# Patient Record
Sex: FEMALE | Race: WHITE | Employment: UNEMPLOYED | URBAN - METROPOLITAN AREA
[De-identification: names, ages, dates, MRNs, and addresses within clinical notes are randomized per-mention and may not be internally consistent; named-entity substitution may affect disease eponyms.]

---

## 2017-12-24 ENCOUNTER — OFFICE VISIT (OUTPATIENT)
Dept: FAMILY MEDICINE CLINIC | Facility: CLINIC | Age: 62
End: 2017-12-24

## 2017-12-24 VITALS
HEART RATE: 78 BPM | DIASTOLIC BLOOD PRESSURE: 88 MMHG | HEIGHT: 65 IN | SYSTOLIC BLOOD PRESSURE: 134 MMHG | TEMPERATURE: 98 F | BODY MASS INDEX: 28.32 KG/M2 | WEIGHT: 170 LBS | RESPIRATION RATE: 14 BRPM

## 2017-12-24 DIAGNOSIS — J06.9 UPPER RESPIRATORY TRACT INFECTION, UNSPECIFIED TYPE: Primary | ICD-10-CM

## 2017-12-24 PROCEDURE — 99202 OFFICE O/P NEW SF 15 MIN: CPT | Performed by: NURSE PRACTITIONER

## 2017-12-24 NOTE — PROGRESS NOTES
CHIEF COMPLAINT:   Patient presents with:  URI      HPI:   Meeta Vazquez is a 63year old female who presents for upper respiratory symptoms for  4 days. Patient reports cough and nasal congestion. Location is upper chest and mid face.  Symptoms are descr EARS: Canals are clear with no discharge or inflammation, TM's are pink and intact, no bulging, no retraction, bony landmarks are visible. Fluid is present behind bothTM's.    NOSE: nostrils patent, clear nasal mucous present, nasal mucosa is erythemic, an Patient verbalized understanding of diagnosis and treatment. All questions answered. No impediment to understanding.   Patient instructed to have her blood pressure checked in next 2 weeks, negative consequences of high blood pressure discussed with patient · Over-the-counter cold medicines will not shorten the length of time you’re sick, but they may be helpful for the following symptoms: cough, sore throat, and nasal and sinus congestion.  (Note: Do not use decongestants if you have high blood pressure.)  Fo

## 2021-09-17 ENCOUNTER — OFFICE VISIT (OUTPATIENT)
Dept: OBGYN CLINIC | Facility: CLINIC | Age: 66
End: 2021-09-17
Payer: COMMERCIAL

## 2021-09-17 VITALS
DIASTOLIC BLOOD PRESSURE: 85 MMHG | SYSTOLIC BLOOD PRESSURE: 130 MMHG | BODY MASS INDEX: 31 KG/M2 | HEART RATE: 75 BPM | WEIGHT: 185 LBS

## 2021-09-17 DIAGNOSIS — Z13.29 THYROID DISORDER SCREEN: ICD-10-CM

## 2021-09-17 DIAGNOSIS — Z13.1 DIABETES MELLITUS SCREENING: ICD-10-CM

## 2021-09-17 DIAGNOSIS — N64.4 BREAST PAIN: ICD-10-CM

## 2021-09-17 DIAGNOSIS — Z13.220 SCREENING CHOLESTEROL LEVEL: ICD-10-CM

## 2021-09-17 DIAGNOSIS — N63.0 LUMP OR MASS IN BREAST: ICD-10-CM

## 2021-09-17 DIAGNOSIS — Z12.4 PAPANICOLAOU SMEAR FOR CERVICAL CANCER SCREENING: ICD-10-CM

## 2021-09-17 DIAGNOSIS — Z13.0 SCREENING FOR DEFICIENCY ANEMIA: ICD-10-CM

## 2021-09-17 DIAGNOSIS — Z01.419 WOMEN'S ANNUAL ROUTINE GYNECOLOGICAL EXAMINATION: Primary | ICD-10-CM

## 2021-09-17 PROCEDURE — 3079F DIAST BP 80-89 MM HG: CPT | Performed by: ADVANCED PRACTICE MIDWIFE

## 2021-09-17 PROCEDURE — 3075F SYST BP GE 130 - 139MM HG: CPT | Performed by: ADVANCED PRACTICE MIDWIFE

## 2021-09-17 PROCEDURE — 99386 PREV VISIT NEW AGE 40-64: CPT | Performed by: ADVANCED PRACTICE MIDWIFE

## 2021-09-20 LAB — HPV I/H RISK 1 DNA SPEC QL NAA+PROBE: NEGATIVE

## 2021-09-21 LAB — LAST PAP RESULT: NORMAL

## 2021-09-23 ENCOUNTER — HOSPITAL ENCOUNTER (OUTPATIENT)
Dept: MAMMOGRAPHY | Facility: HOSPITAL | Age: 66
Discharge: HOME | End: 2021-09-23
Attending: ADVANCED PRACTICE MIDWIFE
Payer: COMMERCIAL

## 2021-09-23 ENCOUNTER — LAB ENCOUNTER (OUTPATIENT)
Dept: LAB | Facility: HOSPITAL | Age: 66
End: 2021-09-23
Attending: ADVANCED PRACTICE MIDWIFE
Payer: COMMERCIAL

## 2021-09-23 ENCOUNTER — HOSPITAL ENCOUNTER (OUTPATIENT)
Dept: ULTRASOUND IMAGING | Facility: HOSPITAL | Age: 66
Discharge: HOME | End: 2021-09-23
Attending: ADVANCED PRACTICE MIDWIFE
Payer: COMMERCIAL

## 2021-09-23 DIAGNOSIS — N63.0 LUMP OR MASS IN BREAST: ICD-10-CM

## 2021-09-23 DIAGNOSIS — Z13.220 SCREENING CHOLESTEROL LEVEL: ICD-10-CM

## 2021-09-23 DIAGNOSIS — Z13.0 SCREENING FOR DEFICIENCY ANEMIA: ICD-10-CM

## 2021-09-23 DIAGNOSIS — Z13.1 DIABETES MELLITUS SCREENING: ICD-10-CM

## 2021-09-23 DIAGNOSIS — Z01.419 WOMEN'S ANNUAL ROUTINE GYNECOLOGICAL EXAMINATION: ICD-10-CM

## 2021-09-23 DIAGNOSIS — N64.4 BREAST PAIN: ICD-10-CM

## 2021-09-23 DIAGNOSIS — Z13.29 THYROID DISORDER SCREEN: ICD-10-CM

## 2021-09-23 LAB
ALBUMIN SERPL-MCNC: 3.8 G/DL (ref 3.4–5)
ALBUMIN/GLOB SERPL: 1.1 {RATIO} (ref 1–2)
ALP LIVER SERPL-CCNC: 115 U/L
ALT SERPL-CCNC: 25 U/L
ANION GAP SERPL CALC-SCNC: 5 MMOL/L (ref 0–18)
AST SERPL-CCNC: 17 U/L (ref 15–37)
BILIRUB SERPL-MCNC: 0.9 MG/DL (ref 0.1–2)
BUN BLD-MCNC: 19 MG/DL (ref 7–18)
BUN/CREAT SERPL: 28.8 (ref 10–20)
CALCIUM BLD-MCNC: 9.3 MG/DL (ref 8.5–10.1)
CHLORIDE SERPL-SCNC: 108 MMOL/L (ref 98–112)
CHOLEST SERPL-MCNC: 196 MG/DL (ref ?–200)
CO2 SERPL-SCNC: 27 MMOL/L (ref 21–32)
CREAT BLD-MCNC: 0.66 MG/DL
DEPRECATED RDW RBC AUTO: 40 FL (ref 35.1–46.3)
ERYTHROCYTE [DISTWIDTH] IN BLOOD BY AUTOMATED COUNT: 12.3 % (ref 11–15)
EST. AVERAGE GLUCOSE BLD GHB EST-MCNC: 169 MG/DL (ref 68–126)
GLOBULIN PLAS-MCNC: 3.6 G/DL (ref 2.8–4.4)
GLUCOSE BLD-MCNC: 153 MG/DL (ref 70–99)
HBA1C MFR BLD HPLC: 7.5 % (ref ?–5.7)
HCT VFR BLD AUTO: 45.9 %
HDLC SERPL-MCNC: 55 MG/DL (ref 40–59)
HGB BLD-MCNC: 15.3 G/DL
LDLC SERPL CALC-MCNC: 113 MG/DL (ref ?–100)
MCH RBC QN AUTO: 29.6 PG (ref 26–34)
MCHC RBC AUTO-ENTMCNC: 33.3 G/DL (ref 31–37)
MCV RBC AUTO: 88.8 FL
NONHDLC SERPL-MCNC: 141 MG/DL (ref ?–130)
OSMOLALITY SERPL CALC.SUM OF ELEC: 295 MOSM/KG (ref 275–295)
PATIENT FASTING Y/N/NP: YES
PATIENT FASTING Y/N/NP: YES
PLATELET # BLD AUTO: 294 10(3)UL (ref 150–450)
POTASSIUM SERPL-SCNC: 4.2 MMOL/L (ref 3.5–5.1)
PROT SERPL-MCNC: 7.4 G/DL (ref 6.4–8.2)
RBC # BLD AUTO: 5.17 X10(6)UL
SODIUM SERPL-SCNC: 140 MMOL/L (ref 136–145)
TRIGL SERPL-MCNC: 163 MG/DL (ref 30–149)
TSI SER-ACNC: 6.08 MIU/ML (ref 0.36–3.74)
VLDLC SERPL CALC-MCNC: 28 MG/DL (ref 0–30)
WBC # BLD AUTO: 7.7 X10(3) UL (ref 4–11)

## 2021-09-23 PROCEDURE — 84443 ASSAY THYROID STIM HORMONE: CPT

## 2021-09-23 PROCEDURE — 36415 COLL VENOUS BLD VENIPUNCTURE: CPT

## 2021-09-23 PROCEDURE — 83036 HEMOGLOBIN GLYCOSYLATED A1C: CPT

## 2021-09-23 PROCEDURE — 77062 BREAST TOMOSYNTHESIS BI: CPT | Performed by: ADVANCED PRACTICE MIDWIFE

## 2021-09-23 PROCEDURE — 3051F HG A1C>EQUAL 7.0%<8.0%: CPT | Performed by: FAMILY MEDICINE

## 2021-09-23 PROCEDURE — 76642 ULTRASOUND BREAST LIMITED: CPT | Performed by: ADVANCED PRACTICE MIDWIFE

## 2021-09-23 PROCEDURE — 80061 LIPID PANEL: CPT

## 2021-09-23 PROCEDURE — 77066 DX MAMMO INCL CAD BI: CPT | Performed by: ADVANCED PRACTICE MIDWIFE

## 2021-09-23 PROCEDURE — 80053 COMPREHEN METABOLIC PANEL: CPT

## 2021-09-23 PROCEDURE — 85027 COMPLETE CBC AUTOMATED: CPT

## 2021-09-24 ENCOUNTER — TELEPHONE (OUTPATIENT)
Dept: OBGYN CLINIC | Facility: CLINIC | Age: 66
End: 2021-09-24

## 2021-09-24 NOTE — TELEPHONE ENCOUNTER
PC to patient. Reviewed normal breast mammo & ultrasound findings. To continue with self breast exams and return if any changes. Discussed abnormal labs- HgB A1C in diabetic range with elevated fasting, abnormal TSH & elevated triglycerides.  Recommend f/u

## 2021-11-15 ENCOUNTER — OFFICE VISIT (OUTPATIENT)
Dept: INTERNAL MEDICINE CLINIC | Facility: CLINIC | Age: 66
End: 2021-11-15
Payer: COMMERCIAL

## 2021-11-15 VITALS
HEIGHT: 66 IN | OXYGEN SATURATION: 99 % | DIASTOLIC BLOOD PRESSURE: 70 MMHG | HEART RATE: 72 BPM | SYSTOLIC BLOOD PRESSURE: 124 MMHG | WEIGHT: 181 LBS | BODY MASS INDEX: 29.09 KG/M2

## 2021-11-15 DIAGNOSIS — H61.21 IMPACTED CERUMEN OF RIGHT EAR: ICD-10-CM

## 2021-11-15 DIAGNOSIS — Z23 NEED FOR INFLUENZA VACCINATION: ICD-10-CM

## 2021-11-15 DIAGNOSIS — R79.89 ELEVATED TSH: ICD-10-CM

## 2021-11-15 DIAGNOSIS — E11.69 TYPE 2 DIABETES MELLITUS WITH OTHER SPECIFIED COMPLICATION, WITHOUT LONG-TERM CURRENT USE OF INSULIN (HCC): Primary | ICD-10-CM

## 2021-11-15 PROCEDURE — 99213 OFFICE O/P EST LOW 20 MIN: CPT | Performed by: FAMILY MEDICINE

## 2021-11-15 PROCEDURE — 3074F SYST BP LT 130 MM HG: CPT | Performed by: FAMILY MEDICINE

## 2021-11-15 PROCEDURE — 3008F BODY MASS INDEX DOCD: CPT | Performed by: FAMILY MEDICINE

## 2021-11-15 PROCEDURE — G8483 FLU IMM NO ADMIN DOC REA: HCPCS | Performed by: FAMILY MEDICINE

## 2021-11-15 PROCEDURE — 3078F DIAST BP <80 MM HG: CPT | Performed by: FAMILY MEDICINE

## 2021-11-15 PROCEDURE — 84443 ASSAY THYROID STIM HORMONE: CPT | Performed by: FAMILY MEDICINE

## 2021-11-18 ENCOUNTER — OFFICE VISIT (OUTPATIENT)
Dept: OTOLARYNGOLOGY | Facility: CLINIC | Age: 66
End: 2021-11-18
Payer: COMMERCIAL

## 2021-11-18 VITALS — WEIGHT: 181 LBS | HEIGHT: 66 IN | BODY MASS INDEX: 29.09 KG/M2

## 2021-11-18 DIAGNOSIS — H61.23 BILATERAL IMPACTED CERUMEN: Primary | ICD-10-CM

## 2021-11-18 PROCEDURE — 99203 OFFICE O/P NEW LOW 30 MIN: CPT | Performed by: OTOLARYNGOLOGY

## 2021-11-18 PROCEDURE — 3008F BODY MASS INDEX DOCD: CPT | Performed by: OTOLARYNGOLOGY

## 2021-11-18 NOTE — PROGRESS NOTES
Bill Lynn is a 61year old female. Patient presents with:  Ear Problem: pt is here today for an ear cleaning.          HISTORY OF PRESENT ILLNESS  11/18/2021  Patient just moved back here from [place] she had seen Drs in the past for wax which they Dyspnea and wheezing. Cardio Negative Chest pain, irregular heartbeat   GI Negative Abdominal pain and diarrhea. Endocrine Negative Cold intolerance and heat intolerance. Neuro Negative Tremors.    Psych Negative Behavioral issues   Integumentary Devi Titus corrected.  While every attempt is made to correct errors during dictation, discrepancies may still exist     Germania Daniel MD 11/18/2021 10:08 AM

## 2021-11-20 NOTE — PROGRESS NOTES
PATIENT IDENTIFICATION  Name: Kat Kimble  MRN: [id number]    Diagnoses:   Type 2 diabetes mellitus with other specified complication, without long-term current use of insulin ([facility]  (primary encounter diagnosis)  Need for influenza vaccination  Elev ear  - ENT - INTERNAL    1. Extensively discussed risks of not treating. Patient endorsed understanding and would prefer to see what repeat a1c is and would like to work on diet in the mean time. Given referral to dietician. 3. Will also repeat TSH today.

## 2021-12-20 ENCOUNTER — OFFICE VISIT (OUTPATIENT)
Dept: INTERNAL MEDICINE CLINIC | Facility: CLINIC | Age: 66
End: 2021-12-20
Payer: COMMERCIAL

## 2021-12-20 VITALS
HEART RATE: 69 BPM | WEIGHT: 175 LBS | RESPIRATION RATE: 15 BRPM | SYSTOLIC BLOOD PRESSURE: 136 MMHG | DIASTOLIC BLOOD PRESSURE: 78 MMHG | HEIGHT: 66 IN | BODY MASS INDEX: 28.13 KG/M2

## 2021-12-20 DIAGNOSIS — E11.69 TYPE 2 DIABETES MELLITUS WITH OTHER SPECIFIED COMPLICATION, WITHOUT LONG-TERM CURRENT USE OF INSULIN (HCC): ICD-10-CM

## 2021-12-20 DIAGNOSIS — Z12.11 COLON CANCER SCREENING: ICD-10-CM

## 2021-12-20 DIAGNOSIS — E78.2 MIXED HYPERLIPIDEMIA: ICD-10-CM

## 2021-12-20 DIAGNOSIS — Z00.00 PREVENTATIVE HEALTH CARE: Primary | ICD-10-CM

## 2021-12-20 PROCEDURE — 82570 ASSAY OF URINE CREATININE: CPT | Performed by: FAMILY MEDICINE

## 2021-12-20 PROCEDURE — 82043 UR ALBUMIN QUANTITATIVE: CPT | Performed by: FAMILY MEDICINE

## 2021-12-20 PROCEDURE — 80061 LIPID PANEL: CPT | Performed by: FAMILY MEDICINE

## 2021-12-20 PROCEDURE — 3008F BODY MASS INDEX DOCD: CPT | Performed by: FAMILY MEDICINE

## 2021-12-20 PROCEDURE — 3075F SYST BP GE 130 - 139MM HG: CPT | Performed by: FAMILY MEDICINE

## 2021-12-20 PROCEDURE — 99396 PREV VISIT EST AGE 40-64: CPT | Performed by: FAMILY MEDICINE

## 2021-12-20 PROCEDURE — 3078F DIAST BP <80 MM HG: CPT | Performed by: FAMILY MEDICINE

## 2021-12-20 PROCEDURE — 83036 HEMOGLOBIN GLYCOSYLATED A1C: CPT | Performed by: FAMILY MEDICINE

## 2021-12-20 NOTE — PROGRESS NOTES
PATIENT IDENTIFICATION  Name: Kulwinder Goodrich  MRN: [id number]    Diagnoses:   Preventative health care  (primary encounter diagnosis)  Type 2 diabetes mellitus with other specified complication, without long-term current use of insulin ([facility]  Mixed hype nourished  HEENT: Normocephalic, atraumatic, normal TMs bilaterally, (posterior oropharynx not examined due to current pandemic and risk of COVID-19)   Neck: no anterior cervical lymphadenopathy  Lungs: chest is clear without rales or wheezing  Heart: S1,

## 2021-12-30 ENCOUNTER — NURSE ONLY (OUTPATIENT)
Dept: INTERNAL MEDICINE CLINIC | Facility: CLINIC | Age: 66
End: 2021-12-30
Payer: COMMERCIAL

## 2021-12-30 DIAGNOSIS — Z12.11 COLON CANCER SCREENING: ICD-10-CM

## 2021-12-30 PROCEDURE — 82274 ASSAY TEST FOR BLOOD FECAL: CPT | Performed by: FAMILY MEDICINE

## 2022-01-05 ENCOUNTER — VIRTUAL PHONE E/M (OUTPATIENT)
Dept: INTERNAL MEDICINE CLINIC | Facility: CLINIC | Age: 67
End: 2022-01-05
Payer: COMMERCIAL

## 2022-01-05 DIAGNOSIS — U07.1 COVID-19 VIRUS INFECTION: Primary | ICD-10-CM

## 2022-01-05 PROCEDURE — 99213 OFFICE O/P EST LOW 20 MIN: CPT | Performed by: FAMILY MEDICINE

## 2022-01-05 NOTE — PROGRESS NOTES
Telehealth outside of [address] Verbal Consent   I conducted a telehealth visit with Eliza Bella today, 01/05/22, which was completed using two-way, real-time interactive audio and video communication.  This has been done in good trace to pr also experiencing mild congestion that week  Then over the new years weekend noted couldn't smell anything. Then on 1/3 went to get tested and was positive. Feeling better today, wants to know if needs to get retested and when to return to work.      R

## 2022-05-11 ENCOUNTER — TELEPHONE (OUTPATIENT)
Dept: INTERNAL MEDICINE CLINIC | Facility: CLINIC | Age: 67
End: 2022-05-11

## 2022-05-12 ENCOUNTER — TELEPHONE (OUTPATIENT)
Dept: INTERNAL MEDICINE CLINIC | Facility: CLINIC | Age: 67
End: 2022-05-12

## 2022-05-16 ENCOUNTER — OFFICE VISIT (OUTPATIENT)
Dept: INTERNAL MEDICINE CLINIC | Facility: CLINIC | Age: 67
End: 2022-05-16
Payer: COMMERCIAL

## 2022-05-16 VITALS
HEIGHT: 66 IN | BODY MASS INDEX: 28.13 KG/M2 | DIASTOLIC BLOOD PRESSURE: 74 MMHG | HEART RATE: 82 BPM | RESPIRATION RATE: 15 BRPM | SYSTOLIC BLOOD PRESSURE: 132 MMHG | WEIGHT: 175 LBS

## 2022-05-16 DIAGNOSIS — L60.8 TOENAIL DEFORMITY: Primary | ICD-10-CM

## 2022-05-16 PROCEDURE — 3008F BODY MASS INDEX DOCD: CPT | Performed by: FAMILY MEDICINE

## 2022-05-16 PROCEDURE — 3078F DIAST BP <80 MM HG: CPT | Performed by: FAMILY MEDICINE

## 2022-05-16 PROCEDURE — 99213 OFFICE O/P EST LOW 20 MIN: CPT | Performed by: FAMILY MEDICINE

## 2022-05-16 PROCEDURE — 3075F SYST BP GE 130 - 139MM HG: CPT | Performed by: FAMILY MEDICINE

## 2022-06-02 ENCOUNTER — OFFICE VISIT (OUTPATIENT)
Dept: INTERNAL MEDICINE CLINIC | Facility: CLINIC | Age: 67
End: 2022-06-02
Payer: COMMERCIAL

## 2022-06-02 VITALS
DIASTOLIC BLOOD PRESSURE: 62 MMHG | SYSTOLIC BLOOD PRESSURE: 136 MMHG | WEIGHT: 177 LBS | HEIGHT: 66 IN | BODY MASS INDEX: 28.45 KG/M2

## 2022-06-02 DIAGNOSIS — E11.69 TYPE 2 DIABETES MELLITUS WITH OTHER SPECIFIED COMPLICATION, WITHOUT LONG-TERM CURRENT USE OF INSULIN (HCC): Primary | ICD-10-CM

## 2022-06-02 DIAGNOSIS — R79.89 ABNORMAL TSH: ICD-10-CM

## 2022-06-02 LAB
CARTRIDGE LOT#: ABNORMAL NUMERIC
CREAT UR-SCNC: 95.2 MG/DL
HEMOGLOBIN A1C: 6.9 % (ref 4.3–5.6)
MICROALBUMIN UR-MCNC: 4.56 MG/DL
MICROALBUMIN/CREAT 24H UR-RTO: 47.9 UG/MG (ref ?–30)
TSI SER-ACNC: 3.5 MIU/ML (ref 0.36–3.74)

## 2022-06-02 PROCEDURE — 3061F NEG MICROALBUMINURIA REV: CPT | Performed by: FAMILY MEDICINE

## 2022-06-02 PROCEDURE — 99214 OFFICE O/P EST MOD 30 MIN: CPT | Performed by: FAMILY MEDICINE

## 2022-06-02 PROCEDURE — 3078F DIAST BP <80 MM HG: CPT | Performed by: FAMILY MEDICINE

## 2022-06-02 PROCEDURE — 82043 UR ALBUMIN QUANTITATIVE: CPT | Performed by: FAMILY MEDICINE

## 2022-06-02 PROCEDURE — 84443 ASSAY THYROID STIM HORMONE: CPT | Performed by: FAMILY MEDICINE

## 2022-06-02 PROCEDURE — 3044F HG A1C LEVEL LT 7.0%: CPT | Performed by: FAMILY MEDICINE

## 2022-06-02 PROCEDURE — 3008F BODY MASS INDEX DOCD: CPT | Performed by: FAMILY MEDICINE

## 2022-06-02 PROCEDURE — 3075F SYST BP GE 130 - 139MM HG: CPT | Performed by: FAMILY MEDICINE

## 2022-06-02 PROCEDURE — 82570 ASSAY OF URINE CREATININE: CPT | Performed by: FAMILY MEDICINE

## 2022-06-02 PROCEDURE — 83036 HEMOGLOBIN GLYCOSYLATED A1C: CPT | Performed by: FAMILY MEDICINE

## 2022-06-02 PROCEDURE — 3060F POS MICROALBUMINURIA REV: CPT | Performed by: FAMILY MEDICINE

## 2022-07-22 ENCOUNTER — OFFICE VISIT (OUTPATIENT)
Dept: PODIATRY CLINIC | Facility: CLINIC | Age: 67
End: 2022-07-22
Payer: MEDICARE

## 2022-07-22 DIAGNOSIS — L60.8 DISCOLORATION AND THICKENING OF NAILS BOTH FEET: Primary | ICD-10-CM

## 2022-07-22 DIAGNOSIS — L60.3 ONYCHODYSTROPHY: ICD-10-CM

## 2022-07-22 PROCEDURE — 11755 BIOPSY NAIL UNIT: CPT | Performed by: PODIATRIST

## 2022-07-22 PROCEDURE — 99203 OFFICE O/P NEW LOW 30 MIN: CPT | Performed by: PODIATRIST

## 2022-07-22 NOTE — PROGRESS NOTES
[address] [facility]  Progress Note    Sourav Hernandez is a 74year old female. Patient presents with:  Toenail Fungus: Referred by Ephraim Roa - Bilateral hallux - started 6mths ago - discoloration - 0/10 pain in office          HPI:     This is a pleasant female that presents to clinic today due to bilateral great toenail fungus. She states she got it from the nail salon. She was using funginail solution for a few months which did not help. She states it started in January. She has also used peroxide. She denies any pain. Allergies: Codeine   No current outpatient medications on file. No past medical history on file. No past surgical history on file. No family history on file. Social History    Socioeconomic History      Marital status:     Tobacco Use      Smoking status: Never Smoker      Smokeless tobacco: Never Used    Substance and Sexual Activity      Alcohol use: Never      Drug use: Never          REVIEW OF SYSTEMS:   Denies nausea, fever, chills  No calf pain  No other muscle or joint aches  Denies chest pain or shortness of breath. EXAM:   There were no vitals taken for this visit. Constitutional:   Patient in no apparent distress. Well kept Of normal body habitus. Alert and oriented to person, place, and time. Integumentary examination:   There are no varicosities. Skin appears moist, warm, and supple with positive hair growth. B/L hallux toenails are thickened discolored dystrophic with subungual debris  Vascular examination:   DP pulse is 2/4  PT pulse is 2/4  Capillary refill is immediate  Edema is not present bilateral.  Temperature warm proximally to warm distally bilateral.  Neurological examination:   Vibratory (128-Hz tuning fork) sensation is present to right and is present to left. Sharp/dull is present to right and is present to left. Musculoskeletal examination:  Muscle Strength is 5/5.   Gait appears normal.      LABS & IMAGING:     Lab Results   Component Value Date     (H) 09/23/2021    BUN 19 (H) 09/23/2021    CREATSERUM 0.66 09/23/2021    BUNCREA 28.8 (H) 09/23/2021    ANIONGAP 5 09/23/2021    GFRAA 108 09/23/2021    GFRNAA 94 09/23/2021    CA 9.3 09/23/2021     09/23/2021    K 4.2 09/23/2021     09/23/2021    CO2 27.0 09/23/2021    OSMOCALC 295 09/23/2021        Lab Results   Component Value Date     (H) 12/20/2021    A1C 6.9 (A) 06/02/2022        No results found. ASSESSMENT AND PLAN:   Diagnoses and all orders for this visit:    Discoloration and thickening of nails both feet    Onychodystrophy        Plan:     Reviewed treatment options for nail dystrophy / onychomycosis including:   No treatment and monitoring, topical meds, oral meds, nail removal and laser treatment. Advantages and disadvantages of each reviewed. Using oral agents would require regular   blood test monitoring due to risk of hepatotoxicity and other adverse reactions including drug interactions. Topical meds are much safer yet carry very low efficacy. Pt elects for toenail biopsy  Procedure: Nail Biopsy 41090  Using nail forceps a portion of the b/l hallux toenail was excised and sent to pathology for PAS stain. Pt tolerated the procedure well without complication. Pt had CMP 9/23/21: Normal AST/ALT    If nail biopsy positive for fungus will prescribe oral lamisil and will call patient with results. Pt to spray feet and shoes with antifungal spray    RTC 2 months    No follow-ups on file.     Precious Morgan, SHAHNAZ  7/22/2022

## 2022-07-25 ENCOUNTER — TELEPHONE (OUTPATIENT)
Dept: PODIATRY CLINIC | Facility: CLINIC | Age: 67
End: 2022-07-25

## 2022-07-25 NOTE — TELEPHONE ENCOUNTER
Please inform patient that her nail biopsy was negative for fungus. We will hold off on the oral antifungal medication. If she has any further questions she can make an appt to f/u in a few weeks.

## 2022-07-25 NOTE — TELEPHONE ENCOUNTER
Left message for patient to give our office a call back.  If patient calls back she should schedule follow up with Dr. Abdelrahman Linder.

## 2022-08-25 ENCOUNTER — TELEPHONE (OUTPATIENT)
Dept: CASE MANAGEMENT | Age: 67
End: 2022-08-25

## 2022-08-25 DIAGNOSIS — Z12.11 COLON CANCER SCREENING: Primary | ICD-10-CM

## 2022-12-14 ENCOUNTER — TELEPHONE (OUTPATIENT)
Dept: INTERNAL MEDICINE CLINIC | Facility: CLINIC | Age: 67
End: 2022-12-14

## 2023-03-07 ENCOUNTER — OFFICE VISIT (OUTPATIENT)
Dept: FAMILY MEDICINE CLINIC | Facility: CLINIC | Age: 68
End: 2023-03-07

## 2023-03-07 VITALS
WEIGHT: 185 LBS | HEIGHT: 66 IN | BODY MASS INDEX: 29.73 KG/M2 | DIASTOLIC BLOOD PRESSURE: 68 MMHG | TEMPERATURE: 98 F | HEART RATE: 78 BPM | SYSTOLIC BLOOD PRESSURE: 134 MMHG | RESPIRATION RATE: 16 BRPM | OXYGEN SATURATION: 98 %

## 2023-03-07 DIAGNOSIS — H61.21 IMPACTED CERUMEN OF RIGHT EAR: ICD-10-CM

## 2023-03-07 DIAGNOSIS — R06.2 WHEEZING: ICD-10-CM

## 2023-03-07 DIAGNOSIS — J01.90 ACUTE NON-RECURRENT SINUSITIS, UNSPECIFIED LOCATION: Primary | ICD-10-CM

## 2023-03-07 PROBLEM — H61.22 IMPACTED CERUMEN OF LEFT EAR: Status: ACTIVE | Noted: 2023-03-07

## 2023-03-07 PROCEDURE — 3078F DIAST BP <80 MM HG: CPT

## 2023-03-07 PROCEDURE — 99213 OFFICE O/P EST LOW 20 MIN: CPT

## 2023-03-07 PROCEDURE — 3008F BODY MASS INDEX DOCD: CPT

## 2023-03-07 PROCEDURE — 3075F SYST BP GE 130 - 139MM HG: CPT

## 2023-03-07 RX ORDER — ALBUTEROL SULFATE 90 UG/1
2 AEROSOL, METERED RESPIRATORY (INHALATION) EVERY 4 HOURS PRN
Qty: 18 G | Refills: 0 | Status: SHIPPED | OUTPATIENT
Start: 2023-03-07 | End: 2023-03-10

## 2023-03-07 RX ORDER — AMOXICILLIN AND CLAVULANATE POTASSIUM 875; 125 MG/1; MG/1
1 TABLET, FILM COATED ORAL 2 TIMES DAILY
Qty: 20 TABLET | Refills: 0 | Status: SHIPPED | OUTPATIENT
Start: 2023-03-07 | End: 2023-03-17

## 2023-03-10 ENCOUNTER — OFFICE VISIT (OUTPATIENT)
Dept: FAMILY MEDICINE CLINIC | Facility: CLINIC | Age: 68
End: 2023-03-10
Payer: MEDICARE

## 2023-03-10 VITALS
WEIGHT: 186 LBS | BODY MASS INDEX: 29.89 KG/M2 | OXYGEN SATURATION: 98 % | RESPIRATION RATE: 16 BRPM | DIASTOLIC BLOOD PRESSURE: 82 MMHG | SYSTOLIC BLOOD PRESSURE: 128 MMHG | TEMPERATURE: 98 F | HEIGHT: 66 IN | HEART RATE: 75 BPM

## 2023-03-10 DIAGNOSIS — H92.01 OTALGIA, RIGHT EAR: Primary | ICD-10-CM

## 2023-03-10 DIAGNOSIS — H61.891 IRRITATION OF EXTERNAL EAR CANAL, RIGHT: ICD-10-CM

## 2023-03-10 PROCEDURE — 99212 OFFICE O/P EST SF 10 MIN: CPT | Performed by: PHYSICIAN ASSISTANT

## 2023-03-10 RX ORDER — OFLOXACIN 3 MG/ML
10 SOLUTION AURICULAR (OTIC) DAILY
Qty: 5 ML | Refills: 0 | Status: SHIPPED | OUTPATIENT
Start: 2023-03-10

## 2023-07-06 ENCOUNTER — OFFICE VISIT (OUTPATIENT)
Dept: OTOLARYNGOLOGY | Facility: CLINIC | Age: 68
End: 2023-07-06

## 2023-07-06 VITALS — TEMPERATURE: 98 F | HEIGHT: 66 IN | WEIGHT: 186 LBS | BODY MASS INDEX: 29.89 KG/M2

## 2023-07-06 DIAGNOSIS — H61.21 IMPACTED CERUMEN OF RIGHT EAR: Primary | ICD-10-CM

## 2023-07-06 PROCEDURE — 69210 REMOVE IMPACTED EAR WAX UNI: CPT | Performed by: STUDENT IN AN ORGANIZED HEALTH CARE EDUCATION/TRAINING PROGRAM

## 2023-07-06 PROCEDURE — 99212 OFFICE O/P EST SF 10 MIN: CPT | Performed by: STUDENT IN AN ORGANIZED HEALTH CARE EDUCATION/TRAINING PROGRAM

## 2023-07-06 PROCEDURE — 3008F BODY MASS INDEX DOCD: CPT | Performed by: STUDENT IN AN ORGANIZED HEALTH CARE EDUCATION/TRAINING PROGRAM

## 2023-08-17 ENCOUNTER — HOSPITAL ENCOUNTER (OUTPATIENT)
Age: 68
Discharge: HOME | End: 2023-08-17
Payer: MEDICAID

## 2023-08-17 ENCOUNTER — APPOINTMENT (OUTPATIENT)
Dept: GENERAL RADIOLOGY | Age: 68
End: 2023-08-17
Attending: NURSE PRACTITIONER
Payer: MEDICAID

## 2023-08-17 VITALS
DIASTOLIC BLOOD PRESSURE: 78 MMHG | SYSTOLIC BLOOD PRESSURE: 153 MMHG | RESPIRATION RATE: 16 BRPM | HEART RATE: 77 BPM | OXYGEN SATURATION: 98 % | TEMPERATURE: 98 F

## 2023-08-17 DIAGNOSIS — M79.673 FOOT PAIN: Primary | ICD-10-CM

## 2023-08-17 PROCEDURE — 99213 OFFICE O/P EST LOW 20 MIN: CPT | Performed by: NURSE PRACTITIONER

## 2023-08-17 PROCEDURE — 73630 X-RAY EXAM OF FOOT: CPT | Performed by: NURSE PRACTITIONER

## 2023-08-17 NOTE — DISCHARGE INSTRUCTIONS
Ae wrap during the day, take off at night   Rest  Ice over ace wrap 20 minutes at a time a few times per day  Elevate  Tylenol 650 mg every 4-6 hours  Ibuprofen 600 mg every 6 hours with food  Follow up with Dr. Wolf Dee as discussed

## 2023-11-20 ENCOUNTER — HOSPITAL ENCOUNTER (OUTPATIENT)
Age: 68
Discharge: ANOTHER HOSPITAL | End: 2023-11-20
Payer: MEDICARE

## 2023-11-20 VITALS
TEMPERATURE: 98 F | OXYGEN SATURATION: 100 % | RESPIRATION RATE: 18 BRPM | HEART RATE: 83 BPM | WEIGHT: 178 LBS | HEIGHT: 65 IN | BODY MASS INDEX: 29.66 KG/M2 | DIASTOLIC BLOOD PRESSURE: 83 MMHG | SYSTOLIC BLOOD PRESSURE: 164 MMHG

## 2023-11-20 DIAGNOSIS — R10.30 LOWER ABDOMINAL PAIN: Primary | ICD-10-CM

## 2023-11-20 PROCEDURE — 99212 OFFICE O/P EST SF 10 MIN: CPT | Performed by: NURSE PRACTITIONER

## 2023-11-21 ENCOUNTER — HOSPITAL ENCOUNTER (EMERGENCY)
Facility: HOSPITAL | Age: 68
Discharge: HOME | End: 2023-11-21
Attending: EMERGENCY MEDICINE
Payer: MEDICARE

## 2023-11-21 ENCOUNTER — APPOINTMENT (OUTPATIENT)
Dept: CT IMAGING | Facility: HOSPITAL | Age: 68
End: 2023-11-21
Attending: EMERGENCY MEDICINE
Payer: MEDICARE

## 2023-11-21 VITALS
TEMPERATURE: 98 F | DIASTOLIC BLOOD PRESSURE: 65 MMHG | OXYGEN SATURATION: 97 % | HEART RATE: 66 BPM | SYSTOLIC BLOOD PRESSURE: 139 MMHG | RESPIRATION RATE: 16 BRPM

## 2023-11-21 DIAGNOSIS — K57.92 ACUTE DIVERTICULITIS: Primary | ICD-10-CM

## 2023-11-21 LAB
ALBUMIN SERPL-MCNC: 4.5 G/DL (ref 3.2–4.8)
ALP LIVER SERPL-CCNC: 112 U/L
ALT SERPL-CCNC: 16 U/L
ANION GAP SERPL CALC-SCNC: 6 MMOL/L (ref 0–18)
AST SERPL-CCNC: 17 U/L (ref ?–34)
BASOPHILS # BLD AUTO: 0.03 X10(3) UL (ref 0–0.2)
BASOPHILS NFR BLD AUTO: 0.4 %
BILIRUB DIRECT SERPL-MCNC: 0.2 MG/DL (ref ?–0.3)
BILIRUB SERPL-MCNC: 0.6 MG/DL (ref 0.2–1.1)
BILIRUB UR QL: NEGATIVE
BUN BLD-MCNC: 12 MG/DL (ref 9–23)
BUN/CREAT SERPL: 16.2 (ref 10–20)
CALCIUM BLD-MCNC: 9.8 MG/DL (ref 8.7–10.4)
CHLORIDE SERPL-SCNC: 104 MMOL/L (ref 98–112)
CLARITY UR: CLEAR
CO2 SERPL-SCNC: 29 MMOL/L (ref 21–32)
COLOR UR: YELLOW
CREAT BLD-MCNC: 0.74 MG/DL
DEPRECATED RDW RBC AUTO: 41.1 FL (ref 35.1–46.3)
EGFRCR SERPLBLD CKD-EPI 2021: 89 ML/MIN/1.73M2 (ref 60–?)
EOSINOPHIL # BLD AUTO: 0.19 X10(3) UL (ref 0–0.7)
EOSINOPHIL NFR BLD AUTO: 2.4 %
ERYTHROCYTE [DISTWIDTH] IN BLOOD BY AUTOMATED COUNT: 12.5 % (ref 11–15)
GLUCOSE BLD-MCNC: 209 MG/DL (ref 70–99)
GLUCOSE UR-MCNC: >1000 MG/DL
HCT VFR BLD AUTO: 46.3 %
HGB BLD-MCNC: 15.2 G/DL
HYALINE CASTS #/AREA URNS AUTO: PRESENT /LPF
IMM GRANULOCYTES # BLD AUTO: 0.02 X10(3) UL (ref 0–1)
IMM GRANULOCYTES NFR BLD: 0.3 %
LEUKOCYTE ESTERASE UR QL STRIP.AUTO: 75
LYMPHOCYTES # BLD AUTO: 2.09 X10(3) UL (ref 1–4)
LYMPHOCYTES NFR BLD AUTO: 26.9 %
MCH RBC QN AUTO: 29.2 PG (ref 26–34)
MCHC RBC AUTO-ENTMCNC: 32.8 G/DL (ref 31–37)
MCV RBC AUTO: 89 FL
MONOCYTES # BLD AUTO: 0.73 X10(3) UL (ref 0.1–1)
MONOCYTES NFR BLD AUTO: 9.4 %
NEUTROPHILS # BLD AUTO: 4.72 X10 (3) UL (ref 1.5–7.7)
NEUTROPHILS # BLD AUTO: 4.72 X10(3) UL (ref 1.5–7.7)
NEUTROPHILS NFR BLD AUTO: 60.6 %
NITRITE UR QL STRIP.AUTO: NEGATIVE
OSMOLALITY SERPL CALC.SUM OF ELEC: 294 MOSM/KG (ref 275–295)
PH UR: 5 [PH] (ref 5–8)
PLATELET # BLD AUTO: 308 10(3)UL (ref 150–450)
POTASSIUM SERPL-SCNC: 4.3 MMOL/L (ref 3.5–5.1)
PROT SERPL-MCNC: 7.4 G/DL (ref 5.7–8.2)
RBC # BLD AUTO: 5.2 X10(6)UL
SODIUM SERPL-SCNC: 139 MMOL/L (ref 136–145)
SP GR UR STRIP: 1.02 (ref 1–1.03)
UROBILINOGEN UR STRIP-ACNC: NORMAL
WBC # BLD AUTO: 7.8 X10(3) UL (ref 4–11)

## 2023-11-21 PROCEDURE — 99284 EMERGENCY DEPT VISIT MOD MDM: CPT

## 2023-11-21 PROCEDURE — 36415 COLL VENOUS BLD VENIPUNCTURE: CPT

## 2023-11-21 PROCEDURE — 87086 URINE CULTURE/COLONY COUNT: CPT | Performed by: EMERGENCY MEDICINE

## 2023-11-21 PROCEDURE — 99285 EMERGENCY DEPT VISIT HI MDM: CPT

## 2023-11-21 PROCEDURE — 74177 CT ABD & PELVIS W/CONTRAST: CPT | Performed by: EMERGENCY MEDICINE

## 2023-11-21 PROCEDURE — 80048 BASIC METABOLIC PNL TOTAL CA: CPT | Performed by: EMERGENCY MEDICINE

## 2023-11-21 PROCEDURE — 80076 HEPATIC FUNCTION PANEL: CPT | Performed by: EMERGENCY MEDICINE

## 2023-11-21 PROCEDURE — 81001 URINALYSIS AUTO W/SCOPE: CPT | Performed by: EMERGENCY MEDICINE

## 2023-11-21 PROCEDURE — 85025 COMPLETE CBC W/AUTO DIFF WBC: CPT | Performed by: EMERGENCY MEDICINE

## 2023-11-21 RX ORDER — METRONIDAZOLE 500 MG/1
500 TABLET ORAL 3 TIMES DAILY
Qty: 30 TABLET | Refills: 0 | Status: SHIPPED | OUTPATIENT
Start: 2023-11-21 | End: 2023-12-01

## 2023-11-21 RX ORDER — CIPROFLOXACIN 500 MG/1
500 TABLET, FILM COATED ORAL 2 TIMES DAILY
Qty: 20 TABLET | Refills: 0 | Status: SHIPPED | OUTPATIENT
Start: 2023-11-21 | End: 2023-12-01

## 2023-11-21 NOTE — ED QUICK NOTES
Pt in need of higher level of care. Pt to be seen at [facility] ED for further evaluation.

## 2023-11-21 NOTE — ED INITIAL ASSESSMENT (HPI)
Pt to ED for lower abdominal pain for the past 2 days. Pt feels nauseous, last BM was yesterday. Pt was at [address] [place] [address] yesterday, told to come to ER.

## 2023-11-21 NOTE — ED INITIAL ASSESSMENT (HPI)
Pt presents with low pelvic pain and cramping. Pt reports, \"feels like menstrual cramping\". No vaginal discharge or bleeding. Pt reports last BM today in am, \"very small\". Last BM was several days prior. Pt reports recently taking Magnesium, CA, and Potassium supplements for the past couple months. Pt stopped taking supplements abruptly 2 weeks ago.

## 2023-11-28 ENCOUNTER — OFFICE VISIT (OUTPATIENT)
Dept: INTERNAL MEDICINE CLINIC | Facility: CLINIC | Age: 68
End: 2023-11-28
Payer: MEDICARE

## 2023-11-28 VITALS
TEMPERATURE: 98 F | BODY MASS INDEX: 29.82 KG/M2 | HEIGHT: 65 IN | OXYGEN SATURATION: 98 % | SYSTOLIC BLOOD PRESSURE: 148 MMHG | WEIGHT: 179 LBS | HEART RATE: 78 BPM | DIASTOLIC BLOOD PRESSURE: 76 MMHG

## 2023-11-28 DIAGNOSIS — Z79.4 TYPE 2 DIABETES MELLITUS WITHOUT COMPLICATION, WITH LONG-TERM CURRENT USE OF INSULIN (HCC): ICD-10-CM

## 2023-11-28 DIAGNOSIS — R03.0 ELEVATED BLOOD PRESSURE READING: ICD-10-CM

## 2023-11-28 DIAGNOSIS — Z00.00 HEALTH MAINTENANCE EXAMINATION: ICD-10-CM

## 2023-11-28 DIAGNOSIS — K57.92 DIVERTICULITIS: Primary | ICD-10-CM

## 2023-11-28 DIAGNOSIS — R01.1 HEART MURMUR: ICD-10-CM

## 2023-11-28 DIAGNOSIS — E11.9 TYPE 2 DIABETES MELLITUS WITHOUT COMPLICATION, WITH LONG-TERM CURRENT USE OF INSULIN (HCC): ICD-10-CM

## 2023-11-28 DIAGNOSIS — E66.3 OVERWEIGHT (BMI 25.0-29.9): ICD-10-CM

## 2023-11-28 DIAGNOSIS — H33.22 LEFT RETINAL DETACHMENT: ICD-10-CM

## 2023-11-28 PROBLEM — J01.90 ACUTE NON-RECURRENT SINUSITIS: Status: RESOLVED | Noted: 2023-03-07 | Resolved: 2023-11-28

## 2023-11-28 PROBLEM — R06.2 WHEEZING: Status: RESOLVED | Noted: 2023-03-07 | Resolved: 2023-11-28

## 2023-11-28 PROBLEM — H61.22 IMPACTED CERUMEN OF LEFT EAR: Status: RESOLVED | Noted: 2023-03-07 | Resolved: 2023-11-28

## 2023-11-28 PROBLEM — H61.21 IMPACTED CERUMEN OF RIGHT EAR: Status: RESOLVED | Noted: 2023-03-07 | Resolved: 2023-11-28

## 2023-11-28 PROCEDURE — 3008F BODY MASS INDEX DOCD: CPT | Performed by: FAMILY MEDICINE

## 2023-11-28 PROCEDURE — 99214 OFFICE O/P EST MOD 30 MIN: CPT | Performed by: FAMILY MEDICINE

## 2023-11-28 PROCEDURE — 1160F RVW MEDS BY RX/DR IN RCRD: CPT | Performed by: FAMILY MEDICINE

## 2023-11-28 PROCEDURE — 3077F SYST BP >= 140 MM HG: CPT | Performed by: FAMILY MEDICINE

## 2023-11-28 PROCEDURE — 1159F MED LIST DOCD IN RCRD: CPT | Performed by: FAMILY MEDICINE

## 2023-11-28 PROCEDURE — 3078F DIAST BP <80 MM HG: CPT | Performed by: FAMILY MEDICINE

## 2023-11-28 NOTE — ASSESSMENT & PLAN NOTE
Patient with elevated blood pressure reading in the office. She prefers monitoring her blood pressures at home for now. Follow-up in 2 weeks to discuss management options.

## 2023-11-28 NOTE — ASSESSMENT & PLAN NOTE
Patient reports a history of left retinal detachment. She is blind in her left eye   Follows with ophthalmology.

## 2023-11-28 NOTE — ASSESSMENT & PLAN NOTE
Patient with a recent episode of diverticulitis. Pain seems to be improved at this time. Advise completing course of antibiotics. Referral to GI for colonoscopy. Advise hydration and increasing fiber in diet. Follow-up as needed.

## 2023-11-28 NOTE — ASSESSMENT & PLAN NOTE
Patient with a history of diabetes  This has not been assessed in over a year. Check hemoglobin A1c, CMP, lipid panel, urine microalbumin, B12. She declines vaccines including the Prevnar 20 vaccine. She prefers not to take medication if at all possible. Discussed need for statin medication however-she will think about this. Need to acquire ophthalmology eye report. Goal A1c less than 7.

## 2023-11-28 NOTE — PATIENT INSTRUCTIONS
PATIENT INSTRUCTIONS    Thank you for seeing me today, it was a pleasure taking care of you. Please check out at the  and schedule a follow up appointment. Return in about 2 weeks (around 12/12/2023) for physical .  Please remember that the vijay period for all appointments is 5 minutes. This is to help maximize the amount of time that we can spend together at our visits. For diverticulitis - need to make appointment with GI - colonoscopy  Complete antibiotics  When feeling better, gradually increase fiber in diet, can take supplement such as citrucel   Stay well hydrated  Gentle with foods   You need a statin cholesterol medication   We will see how your diabetic control is with your blood test today (A1c) and determine need for diabetes medication  Please have eye doctor send me records  Please have the doctor fax his/her note and examination to our office at [phone]. Please monitor your blood pressures at home using a blood pressure machine with a cuff that goes over your arm (not your wrist). If you do not have a blood pressure machine, you can consider purchasing an [employer] blood pressure machine (available on [address] [facility] [address]). Please check your blood pressures at once a day and record your blood pressures in a log. Please bring your blood pressure log to your next doctor's appointment with me. Please also bring your blood pressure machine to your next doctor's appointment to see if it is accurate. Echo of heart  Please call [phone] to schedule your imaging appointment.        [place],  Dr. Mónica Segura

## 2023-11-29 LAB
ALBUMIN/GLOBULIN RATIO: 1.7 (CALC) (ref 1–2.5)
ALBUMIN: 4.5 G/DL (ref 3.6–5.1)
ALKALINE PHOSPHATASE: 97 U/L (ref 37–153)
ALT: 20 U/L (ref 6–29)
AST: 19 U/L (ref 10–35)
BILIRUBIN, TOTAL: 0.6 MG/DL (ref 0.2–1.2)
BUN: 18 MG/DL (ref 7–25)
CALCIUM: 9.9 MG/DL (ref 8.6–10.4)
CARBON DIOXIDE: 23 MMOL/L (ref 20–32)
CHLORIDE: 101 MMOL/L (ref 98–110)
CHOL/HDLC RATIO: 2.2 (CALC)
CHOLESTEROL, TOTAL: 144 MG/DL
CREATININE, RANDOM URINE: 111 MG/DL (ref 20–275)
CREATININE: 0.72 MG/DL (ref 0.5–1.05)
EGFR: 92 ML/MIN/1.73M2
GLOBULIN: 2.7 G/DL (CALC) (ref 1.9–3.7)
GLUCOSE: 186 MG/DL (ref 65–99)
HDL CHOLESTEROL: 65 MG/DL
HEMOGLOBIN A1C: 8.1 % OF TOTAL HGB
LDL-CHOLESTEROL: 65 MG/DL (CALC)
MICROALBUMIN/CREATININE RATIO, RANDOM URINE: 20 MCG/MG CREAT
MICROALBUMIN: 2.2 MG/DL
NON-HDL CHOLESTEROL: 79 MG/DL (CALC)
POTASSIUM: 5.3 MMOL/L (ref 3.5–5.3)
PROTEIN, TOTAL: 7.2 G/DL (ref 6.1–8.1)
SODIUM: 137 MMOL/L (ref 135–146)
TRIGLYCERIDES: 61 MG/DL
VITAMIN B12: 473 PG/ML (ref 200–1100)

## 2023-12-04 ENCOUNTER — HOSPITAL ENCOUNTER (OUTPATIENT)
Dept: CV DIAGNOSTICS | Facility: HOSPITAL | Age: 68
Discharge: HOME | End: 2023-12-04
Attending: FAMILY MEDICINE
Payer: MEDICARE

## 2023-12-04 DIAGNOSIS — R03.0 ELEVATED BLOOD PRESSURE READING: ICD-10-CM

## 2023-12-04 PROCEDURE — 93306 TTE W/DOPPLER COMPLETE: CPT | Performed by: FAMILY MEDICINE

## 2023-12-13 ENCOUNTER — OFFICE VISIT (OUTPATIENT)
Dept: INTERNAL MEDICINE CLINIC | Facility: CLINIC | Age: 68
End: 2023-12-13
Payer: MEDICARE

## 2023-12-13 VITALS
SYSTOLIC BLOOD PRESSURE: 132 MMHG | TEMPERATURE: 97 F | HEIGHT: 65 IN | HEART RATE: 70 BPM | WEIGHT: 175 LBS | OXYGEN SATURATION: 98 % | BODY MASS INDEX: 29.16 KG/M2 | DIASTOLIC BLOOD PRESSURE: 76 MMHG

## 2023-12-13 DIAGNOSIS — Z12.31 SCREENING MAMMOGRAM FOR BREAST CANCER: ICD-10-CM

## 2023-12-13 DIAGNOSIS — I35.1 AORTIC VALVE INSUFFICIENCY, ETIOLOGY OF CARDIAC VALVE DISEASE UNSPECIFIED: ICD-10-CM

## 2023-12-13 DIAGNOSIS — Z00.00 HEALTH MAINTENANCE EXAMINATION: Primary | ICD-10-CM

## 2023-12-13 DIAGNOSIS — E66.3 OVERWEIGHT (BMI 25.0-29.9): ICD-10-CM

## 2023-12-13 DIAGNOSIS — K57.92 DIVERTICULITIS: ICD-10-CM

## 2023-12-13 DIAGNOSIS — Z78.0 ASYMPTOMATIC MENOPAUSAL STATE: ICD-10-CM

## 2023-12-13 DIAGNOSIS — H33.22 LEFT RETINAL DETACHMENT: ICD-10-CM

## 2023-12-13 DIAGNOSIS — Z79.4 TYPE 2 DIABETES MELLITUS WITHOUT COMPLICATION, WITH LONG-TERM CURRENT USE OF INSULIN (HCC): ICD-10-CM

## 2023-12-13 DIAGNOSIS — R01.1 HEART MURMUR: ICD-10-CM

## 2023-12-13 DIAGNOSIS — E11.9 TYPE 2 DIABETES MELLITUS WITHOUT COMPLICATION, WITH LONG-TERM CURRENT USE OF INSULIN (HCC): ICD-10-CM

## 2023-12-13 DIAGNOSIS — R03.0 ELEVATED BLOOD PRESSURE READING: ICD-10-CM

## 2023-12-13 NOTE — ASSESSMENT & PLAN NOTE
Diabetes is not well-controlled at this time. Goal A1c is less than 7. I discussed that she can consider medication such as metformin. She prefers not to take medication if at all possible. She instead would like to make lifestyle modifications to see if she can bring down her blood sugars for now. Discussed need for statin medication however-she still declines. Need to acquire ophthalmology eye report. She declines vaccines including the Prevnar 20 vaccine.

## 2023-12-13 NOTE — ASSESSMENT & PLAN NOTE
Patient with a recent episode of diverticulitis. Pain is now resolved. Referral to GI for colonoscopy. Advise hydration and increasing fiber in diet. Follow-up as needed.

## 2023-12-13 NOTE — ASSESSMENT & PLAN NOTE
Exercise and diet advised. Labs reviewed in the office today. Flu shot declined  Shingles vaccine declined  Advanced directive information provided. Advised COVID vaccine-declines. Colonoscopy referral provided. Mammogram ordered. DEXA scan ordered.

## 2023-12-13 NOTE — ASSESSMENT & PLAN NOTE
Blood pressure reading improved at home. Blood pressure also improved here in the office today  We will continue to monitor blood pressures during routine visits.

## 2024-03-18 ENCOUNTER — TELEPHONE (OUTPATIENT)
Dept: INTERNAL MEDICINE CLINIC | Facility: CLINIC | Age: 69
End: 2024-03-18

## 2024-03-18 NOTE — TELEPHONE ENCOUNTER
does not require patient to be fasting however if patient would like to she is more then welcome

## 2024-03-18 NOTE — TELEPHONE ENCOUNTER
Patient called as she has a 3 month follow up appointment with Dr. Wilkes on 3/27.  She would like to know if she needs to fast for her lab work?

## 2024-03-27 ENCOUNTER — OFFICE VISIT (OUTPATIENT)
Dept: INTERNAL MEDICINE CLINIC | Facility: CLINIC | Age: 69
End: 2024-03-27
Payer: MEDICARE

## 2024-03-27 VITALS
OXYGEN SATURATION: 98 % | DIASTOLIC BLOOD PRESSURE: 78 MMHG | BODY MASS INDEX: 27.99 KG/M2 | SYSTOLIC BLOOD PRESSURE: 132 MMHG | WEIGHT: 168 LBS | HEART RATE: 94 BPM | HEIGHT: 65 IN | TEMPERATURE: 98 F

## 2024-03-27 DIAGNOSIS — Z00.00 HEALTH MAINTENANCE EXAMINATION: ICD-10-CM

## 2024-03-27 DIAGNOSIS — E11.9 TYPE 2 DIABETES MELLITUS WITHOUT COMPLICATION, WITH LONG-TERM CURRENT USE OF INSULIN (HCC): Primary | ICD-10-CM

## 2024-03-27 DIAGNOSIS — K57.92 DIVERTICULITIS: ICD-10-CM

## 2024-03-27 DIAGNOSIS — Z79.4 TYPE 2 DIABETES MELLITUS WITHOUT COMPLICATION, WITH LONG-TERM CURRENT USE OF INSULIN (HCC): Primary | ICD-10-CM

## 2024-03-27 DIAGNOSIS — E66.3 OVERWEIGHT (BMI 25.0-29.9): ICD-10-CM

## 2024-03-27 PROCEDURE — 99214 OFFICE O/P EST MOD 30 MIN: CPT | Performed by: FAMILY MEDICINE

## 2024-03-27 NOTE — PROGRESS NOTES
FAMILY MEDICINE CLINIC NOTE    HPI  Yessenia Ruiz is a 68 year old female presenting for        #DM  -Hba1c: 8.1 11/2023, will recheck   -Microalbuminuria: 11/2023  -B12: N/A  -Pneumonia vaccine: Indicated - declines  -HepB: Declines vaccines   -Eye exam:  follows with Dr Pineda - saw in December, need to acquire records. Addendum: eye report received 12/20/23  -Diabetic foot exam: 11/28/2023 Bilateral barefoot skin diabetic exam is normal, visualized feet and the appearance is normal. Bilateral monofilament/sensation of both feet is normal. Pulsation pedal pulse exam of both lower legs/feet is normal as well.  -Current medications: None - does not desire to be on any medications  -Blood sugars:  -AM:      -Noon:   -PM:      -hypoglycemia:    #Overweight  -watching diet, cutting carbs  -losing weight at this time    #Elevated blood pressure  -improved in office today  -130/80s at home      #History of diverticulitis  -needs colonoscopy still      ----other     #History of retinal detachment  -blind in left eye   -started at age 15  -ophthalmology Dr Pineda      #Mild aortic valve regurgitation  -seen on echo 12/2023           ROS  GENERAL: No fever/chills, no recent weight loss  HEENT: No visual changes, no changes in hearing, no sore throats  NECK: No pain, no swelling  RESP: No cough, no SOB  CV: No chest pain, no palpitations  GI: No abd pain, no N/V/D  MSK: No edema  SKIN: No new rashes  NEURO: No numbness, no tingling, no headaches    HEALTH MAINTENANCE  Health Maintenance Topics with due status: Overdue       Topic Date Due    Pneumococcal Vaccine: 65+ Years Never done    Zoster Vaccines Never done    DEXA Scan Never done    Mammogram 09/23/2022    Colorectal Cancer Screening 12/30/2022    Diabetes Care Dilated Eye Exam 01/27/2023    COVID-19 Vaccine Never done    Influenza Vaccine Never done    MA Annual Health Assessment 01/01/2024    Annual Depression Screening 01/01/2024    Fall Risk Screening (Annual)  01/01/2024    Diabetes Care A1C 02/28/2024       ALLERGIES  Allergies   Allergen Reactions    Codeine NAUSEA AND VOMITING       MEDICATIONS  No current outpatient medications on file.       ACTIVE PROBLEMS  Patient Active Problem List   Diagnosis    Type 2 diabetes mellitus without complication, with long-term current use of insulin ([facility])    Diverticulitis    Overweight (BMI 25.0-29.9)    Left retinal detachment    Heart murmur    Health maintenance examination    Aortic valve regurgitation       PAST MEDICAL HISTORY  Past Medical History:   Diagnosis Date    Diverticulitis 11/28/2023    Retinal detachment     Blind in left eye    Type 2 diabetes mellitus without complication, with long-term current use of insulin ([facility]) 11/28/2023       PAST SOCIAL HISTORY  Social History     Socioeconomic History    Marital status:      Spouse name: Not on file    Number of children: Not on file    Years of education: Not on file    Highest education level: Not on file   Occupational History    Not on file   Tobacco Use    Smoking status: Never    Smokeless tobacco: Never   Vaping Use    Vaping Use: Never used   Substance and Sexual Activity    Alcohol use: Yes     Comment: Holidays    Drug use: Never    Sexual activity: Not Currently     Partners: Male   Other Topics Concern    Caffeine Concern Not Asked    Exercise Not Asked    Seat Belt Not Asked    Special Diet Not Asked    Stress Concern Not Asked    Weight Concern Not Asked   Social History Narrative    Relationships: . Mother lives with her - very independent    Children: Carlos (adults). 2 grandchildren from [place].     Pets: None    School: N/A    Work: Retired from [employer]. 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 Lived in [place] for many years. Irish background.     Interests: Designs jewelry, crystal shows, drawing and painting. Enjoys traveling to [place] to see son.     Spiritual:  Rastafari.      Social Determinants of Health     Financial Resource Strain: Not on file   Food Insecurity: Not on file   Transportation Needs: Not on file   Physical Activity: Not on file   Stress: Not on file   Social Connections: Not on file   Housing Stability: Not on file       PAST SURGICAL HISTORY  Past Surgical History:   Procedure Laterality Date    CATARACT EXTRACTION Right     EYE SURGERY Left     History of retinal detachment       PAST FAMILY HISTORY  Family History   Problem Relation Age of Onset    Stroke Mother     Cancer Father         Lung    Hyperlipidemia Sister     No Known Problems Maternal Grandmother     No Known Problems Maternal Grandfather     No Known Problems Paternal Grandmother     No Known Problems Paternal Grandfather     Colon Cancer Neg     Breast Cancer Neg          PHYSICAL EXAM  Vitals:    03/27/24 0759 03/27/24 0812   BP: 156/84 132/78   Pulse: 94    Temp: 98.4 °F (36.9 °C)    SpO2: 98%    Weight: 168 lb (76.2 kg)    Height: 5' 5\" (1.651 m)       Body mass index is 27.96 kg/m².    GENERAL: NAD  RESP: Non-labored respirations, CTAB, no wheezing, no rales, no ronchi  CV: RRR, no murmurs  SKIN: Warm and dry, no rashes  NEURO: Answering questions appropriately    LABS  Lab Results   Component Value Date    WBC 7.8 11/21/2023    HGB 15.2 11/21/2023    HCT 46.3 11/21/2023    .0 11/21/2023    NEPERCENT 60.6 11/21/2023    LYPERCENT 26.9 11/21/2023    MOPERCENT 9.4 11/21/2023    EOPERCENT 2.4 11/21/2023    BAPERCENT 0.4 11/21/2023    NE 4.72 11/21/2023    LYMABS 2.09 11/21/2023    MOABSO 0.73 11/21/2023    EOABSO 0.19 11/21/2023    BAABSO 0.03 11/21/2023       Lab Results   Component Value Date     11/28/2023    K 5.3 11/28/2023     11/28/2023    CO2 23 11/28/2023    ANIONGAP 6 11/21/2023    BUN 18 11/28/2023    CREATSERUM 0.72 11/28/2023    BUNCREA SEE NOTE: 11/28/2023     (H) 11/28/2023    CA 9.9 11/28/2023    OSMOCALC 294 11/21/2023    GFRNAA 94 09/23/2021     GFRAA 108 2021    ALT 20 2023    AST 19 2023    ALKPHO 97 2023    BILT 0.6 2023    TP 7.2 2023    ALB 4.5 2023    GLOBULIN 2.7 2023    ELECTAG 1.1 2021    FASTING Yes 2021    FASTING Yes 2021         Lab Results   Component Value Date    CHOLEST 144 2023    TRIG 61 2023    HDL 65 2023    LDL 65 2023    VLDL 17 2021    TCHDLRATIO 2.2 2023    NONHDLC 79 2023        DIAGNOSTICS      ASSESSMENT/PLAN  Problem List Items Addressed This Visit          HCC Problems    Type 2 diabetes mellitus without complication, with long-term current use of insulin (HCC) - Primary     Goal A1c is less than 7.  Check A1c today.   Previously discussed that she can consider medication such as metformin.  She prefers not to take medication if at all possible.  She instead would like to make lifestyle modifications to see if she can bring down her blood sugars for now.  Previously discussed need for statin medication however she declines.   Need to acquire ophthalmology eye report.  She declines vaccines including the Prevnar 20 vaccine.         Relevant Orders    Hemoglobin A1C       Endocrine and Metabolic    Overweight (BMI 25.0-29.9)     Losing weight.  Continue healthy diet.  Advised to increase exercise if possible            Gastrointestinal and Abdominal    Diverticulitis     Patient with a recent episode of diverticulitis.  Pain is now resolved.  Referral to GI for colonoscopy.  Advise hydration and increasing fiber in diet.  Follow-up as needed.         Relevant Orders    [facility] Telephone Colon Screen       Health Encounters    Health maintenance examination    Relevant Orders    [facility] Telephone Colon Screen       Return in about 3 months (around 2024) for medicare visit.    No topic due editable text     Chele Wilkes MD  Family Medicine           Pre-chartin minutes  Reviewing/obtaining: 10 minutes  Medical  Exam:1 minutes  Counseling/education: 5 minutes  Notes: 5 minutes  Referring/communicatin minutes  Care coordination: 0 minutes    My total time spent caring for the patient on the day of the encounter: 23 minutes

## 2024-03-27 NOTE — ASSESSMENT & PLAN NOTE
Goal A1c is less than 7.  Check A1c today.   Previously discussed that she can consider medication such as metformin.  She prefers not to take medication if at all possible.  She instead would like to make lifestyle modifications to see if she can bring down her blood sugars for now.  Previously discussed need for statin medication however she declines.   Need to acquire ophthalmology eye report.  She declines vaccines including the Prevnar 20 vaccine.

## 2024-03-27 NOTE — PATIENT INSTRUCTIONS
PATIENT INSTRUCTIONS    Thank you for seeing me today, it was a pleasure taking care of you.  Please check out at the  and schedule a follow up appointment.  Return in about 3 months (around 6/27/2024) for medicare visit.  Please remember that the vijay period for all appointments is 5 minutes. This is to help maximize the amount of time that we can spend together at our visits.    See GI  Provider Address Phone   Frantz Gordon MD [address] [facility] [address]  [facility] [id number] [phone]      Get eye information  Please have the doctor fax his/her note and examination to our office at [phone].  Continue to focus on healthy diet and exercise    Best,  Dr. Wilkes

## 2024-03-28 LAB — HEMOGLOBIN A1C: 6.8 % OF TOTAL HGB

## 2024-04-29 ENCOUNTER — HOSPITAL ENCOUNTER (OUTPATIENT)
Dept: BONE DENSITY | Age: 69
Discharge: HOME | End: 2024-04-29
Attending: FAMILY MEDICINE
Payer: MEDICARE

## 2024-04-29 ENCOUNTER — HOSPITAL ENCOUNTER (OUTPATIENT)
Dept: MAMMOGRAPHY | Age: 69
Discharge: HOME | End: 2024-04-29
Attending: FAMILY MEDICINE
Payer: MEDICARE

## 2024-04-29 DIAGNOSIS — Z00.00 HEALTH MAINTENANCE EXAMINATION: ICD-10-CM

## 2024-04-29 DIAGNOSIS — Z78.0 ASYMPTOMATIC MENOPAUSAL STATE: ICD-10-CM

## 2024-04-29 DIAGNOSIS — Z12.31 SCREENING MAMMOGRAM FOR BREAST CANCER: ICD-10-CM

## 2024-04-29 PROCEDURE — 77063 BREAST TOMOSYNTHESIS BI: CPT | Performed by: FAMILY MEDICINE

## 2024-04-29 PROCEDURE — 77080 DXA BONE DENSITY AXIAL: CPT | Performed by: FAMILY MEDICINE

## 2024-04-29 PROCEDURE — 77067 SCR MAMMO BI INCL CAD: CPT | Performed by: FAMILY MEDICINE

## 2024-04-30 PROBLEM — M85.80 OSTEOPENIA: Status: ACTIVE | Noted: 2024-04-30

## 2024-05-29 ENCOUNTER — HOSPITAL ENCOUNTER (OUTPATIENT)
Age: 69
Discharge: HOME | End: 2024-05-29
Attending: EMERGENCY MEDICINE

## 2024-05-29 ENCOUNTER — OFFICE VISIT (OUTPATIENT)
Dept: FAMILY MEDICINE CLINIC | Facility: CLINIC | Age: 69
End: 2024-05-29

## 2024-05-29 ENCOUNTER — NURSE TRIAGE (OUTPATIENT)
Dept: INTERNAL MEDICINE CLINIC | Facility: CLINIC | Age: 69
End: 2024-05-29

## 2024-05-29 ENCOUNTER — APPOINTMENT (OUTPATIENT)
Dept: CT IMAGING | Age: 69
End: 2024-05-29
Attending: EMERGENCY MEDICINE

## 2024-05-29 VITALS
HEART RATE: 66 BPM | OXYGEN SATURATION: 98 % | TEMPERATURE: 97 F | SYSTOLIC BLOOD PRESSURE: 156 MMHG | RESPIRATION RATE: 18 BRPM | DIASTOLIC BLOOD PRESSURE: 83 MMHG

## 2024-05-29 VITALS
BODY MASS INDEX: 28.82 KG/M2 | TEMPERATURE: 98 F | HEART RATE: 70 BPM | HEIGHT: 65 IN | WEIGHT: 173 LBS | DIASTOLIC BLOOD PRESSURE: 82 MMHG | SYSTOLIC BLOOD PRESSURE: 140 MMHG | OXYGEN SATURATION: 98 % | RESPIRATION RATE: 16 BRPM

## 2024-05-29 DIAGNOSIS — M54.50 ACUTE BILATERAL LOW BACK PAIN WITHOUT SCIATICA: Primary | ICD-10-CM

## 2024-05-29 DIAGNOSIS — R10.9 ABDOMINAL CRAMPING: ICD-10-CM

## 2024-05-29 DIAGNOSIS — S39.012A STRAIN OF LUMBAR REGION, INITIAL ENCOUNTER: Primary | ICD-10-CM

## 2024-05-29 LAB
BILIRUBIN: NEGATIVE
GLUCOSE (URINE DIPSTICK): 100 MG/DL
KETONES (URINE DIPSTICK): NEGATIVE MG/DL
LEUKOCYTES: NEGATIVE
MULTISTIX LOT#: ABNORMAL NUMERIC
NITRITE, URINE: NEGATIVE
OCCULT BLOOD: NEGATIVE
PH, URINE: 5.5 (ref 4.5–8)
PROTEIN (URINE DIPSTICK): NEGATIVE MG/DL
SPECIFIC GRAVITY: 1.02 (ref 1–1.03)
URINE-COLOR: YELLOW
UROBILINOGEN,SEMI-QN: 0.2 MG/DL (ref 0–1.9)

## 2024-05-29 PROCEDURE — 74176 CT ABD & PELVIS W/O CONTRAST: CPT | Performed by: EMERGENCY MEDICINE

## 2024-05-29 PROCEDURE — 99214 OFFICE O/P EST MOD 30 MIN: CPT

## 2024-05-29 PROCEDURE — 81003 URINALYSIS AUTO W/O SCOPE: CPT | Performed by: NURSE PRACTITIONER

## 2024-05-29 PROCEDURE — 87086 URINE CULTURE/COLONY COUNT: CPT | Performed by: NURSE PRACTITIONER

## 2024-05-29 RX ORDER — CYCLOBENZAPRINE HCL 5 MG
5 TABLET ORAL 3 TIMES DAILY PRN
Qty: 15 TABLET | Refills: 0 | Status: SHIPPED | OUTPATIENT
Start: 2024-05-29

## 2024-05-29 NOTE — TELEPHONE ENCOUNTER
Patient called the office as she is having lower back pain.    The pain started on Sunday.  When she had an art show she lifted a bin and felt a pull.    Taking Tylenol (2) pills every 6 hours.  Still having pain and is now radiating to the front. She said she does get Diverticulitis and isn't sure if that could be happening too.    Patient needs to know if she needs to come into the office or should go to Immediate Care.

## 2024-05-29 NOTE — ED PROVIDER NOTES
Patient Seen in: [facility] [place]      History     Chief Complaint   Patient presents with    Back Pain     Stated Complaint: Low back pain wrapping into some abdominal cramping.  No fever, urine unremarka*    Subjective:   HPI    Patient is a 68-year-old female with past history of kidney stone in the remote past, diverticulitis, type 2 diabetes who presents now with back pain.  Patient states this pain began on Sunday.  The patient states she was lifting a heavy bin when she felt a pulling sensation in her central lower back.  The patient has had worsening pain since that time patient states pain is most pronounced in the central lower back.  The patient went to the walk-in clinic today and was referred here for further evaluation.  The patient states pain will occasionally radiate around to the abdominal area.  The patient denies any radiation of pain into the legs.  The patient denies any bowel or bladder dysfunction.  The patient denies any urinary complaints.  The patient had a urinalysis done at the walk-in clinic which was reportedly negative.      Objective:   Past Medical History:    Diverticulitis    Retinal detachment    Blind in left eye    Type 2 diabetes mellitus without complication, with long-term current use of insulin ([facility])              Past Surgical History:   Procedure Laterality Date    Cataract extraction Right     Eye surgery Left     History of retinal detachment                Social History     Socioeconomic History    Marital status:    Tobacco Use    Smoking status: Never    Smokeless tobacco: Never   Vaping Use    Vaping status: Never Used   Substance and Sexual Activity    Alcohol use: Yes     Comment: Holidays    Drug use: Never    Sexual activity: Not Currently     Partners: Male   Social History Narrative    Relationships: . Mother lives with her - very independent    Children: Quintin and Josefina (adults). 2 grandchildren from Quintin.     Pets: None    School:  N/A    Work: Retired from [employer]. 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 Lived in [place] for many years. [place] background.     Interests: Designs jewelry, crystal shows, drawing and painting. Enjoys traveling to [place] to see son.     Spiritual: Mormonism.               Review of Systems    Positive for stated complaint: Low back pain wrapping into some abdominal cramping.  No fever, urine unremarka*  Other systems are as noted in HPI.  Constitutional and vital signs reviewed.      All other systems reviewed and negative except as noted above.    Physical Exam     ED Triage Vitals [05/29/24 1053]   /83   Pulse 66   Resp 18   Temp 97.4 °F (36.3 °C)   Temp src Temporal   SpO2 98 %   O2 Device None (Room air)       Current Vitals:   Vital Signs  BP: 156/83  Pulse: 66  Resp: 18  Temp: 97.4 °F (36.3 °C)  Temp src: Temporal    Oxygen Therapy  SpO2: 98 %  O2 Device: None (Room air)            Physical Exam    Constitutional: Well-developed well-nourished in no acute distress  Head: Normocephalic, no swelling or tenderness  Eyes: Nonicteric sclera, no conjunctival injection  ENT: TMs are clear and flat bilaterally.  There is no posterior pharyngeal erythema  Chest: Clear to auscultation, no tenderness  Cardiovascular: Regular rate and rhythm without murmur  Abdomen: Soft and nondistended; there is minimal left periumbilical tenderness to palpation  Back: Mild bilateral central lumbar paraspinal muscular tenderness to palpation  Neurologic: Patient is awake, alert and oriented ×3.  The patient's motor strength is 5 out of 5 and symmetric in the upper and lower extremities bilaterally  Extremities: No focal swelling or tenderness  Skin: No pallor, no redness or warmth to the touch      ED Course   Labs Reviewed - No data to display          Patient's CT images and the radiologist report demonstrating no kidney stone, colonic diverticulosis without  diverticulitis was reviewed by myself.    Patient's CT was reviewed with her.  Etiology of the patient's pain appears to be musculoskeletal in origin.  Patient states pain worsens with changing positions or twisting/turning.  Recommend anti-inflammatories for pain.  The risks and benefits of muscle relaxers were discussed with the patient.  She prefers a trial of small dose of muscle relaxer.  Will provide with physiatry follow-up.         MDM      back pain including but not limited to muscular pain, herniated disc, spine fracture, intra-abdominal causes and urinary tract infection                                     Medical Decision Making      Disposition and Plan     Clinical Impression:  1. Strain of lumbar region, initial encounter         Disposition:  Discharge  5/29/2024 11:54 am    Follow-up:  Chele Wilkes MD  [address] [facility] [id number] [phone]      As needed    Larry Rodriguez Y, DO  [address]  [place] [id number] [phone]      Any persistent back pain          Medications Prescribed:  Discharge Medication List as of 5/29/2024 12:01 PM        START taking these medications    Details   cyclobenzaprine 5 MG Oral Tab Take 1 tablet (5 mg total) by mouth 3 (three) times daily as needed for Muscle spasms., Normal, Disp-15 tablet, R-0

## 2024-05-29 NOTE — TELEPHONE ENCOUNTER
Pt will go to walk-in clinic.   Reason for Disposition   SEVERE back pain (e.g., excruciating, unable to do any normal activities) and not improved after pain medicine and CARE ADVICE    Answer Assessment - Initial Assessment Questions  1. MECHANISM: \"How did the injury happen?\" (Consider the possibility of domestic violence or elder abuse)      Lifted a bin with crystal   2. ONSET: \"When did the injury happen?\" (Minutes or hours ago)      5/26/24  3. LOCATION: \"What part of the back is injured?\"      Lower back   4. SEVERITY: \"Can you move the back normally?\"      Yes  5. PAIN: \"Is there any pain?\" If Yes, ask: \"How bad is the pain?\"   (Scale 1-10; or mild, moderate, severe)      8/10  6. CORD SYMPTOMS: Any weakness or numbness of the arms or legs?\"      No   7. SIZE: For cuts, bruises, or swelling, ask: \"How large is it?\" (e.g., inches or centimeters)      No   8. TETANUS: For any breaks in the skin, ask: \"When was the last tetanus booster?\"      yes  9. OTHER SYMPTOMS: \"Do you have any other symptoms?\" (e.g., abdomen pain, blood in urine)      Stomach pain   10. PREGNANCY: \"Is there any chance you are pregnant?\" \"When was your last menstrual period?\"        NA    Protocols used: Back Injury-A-[place]

## 2024-05-29 NOTE — PROGRESS NOTES
CHIEF COMPLAINT:     Chief Complaint   Patient presents with    Low Back Pain     Sx Sunday - Pt was moving a box, twisted the wrong way, and felt like she pulled a muscle and ate a bag of popcorn  Sx Monday - Bilat lower back pain, stomach bloating, lower abdominal cramping  Denies urinary frequency, urgency, any urinary symptoms  OTC Tylenol       HPI:   Yessenia Ruiz is a 68 year old female who presents with complaints of low back ache starting 3 days ago.  By the 2nd day, pt noticed some intermittent pain wrapping toward the front, describes this as more of an abdominal cramp.  Reports back pain 8/10.  She had been lifting some heavy boxes at an art show just prior to onset.  Taking tylenol without relief.  Feels \"a little queasy\" but denies N/V/D/C.  Hx of diverticulitis and presented somewhat similarly for her in the past.  Distant Hx of kidney stones/UTI, nothing recent.  Tolerating PO.  Denies fever, chest pain, dyspnea, SOB.  Tolerating PO but lower appetite.    No current outpatient medications on file.      Past Medical History:    Diverticulitis    Retinal detachment    Blind in left eye    Type 2 diabetes mellitus without complication, with long-term current use of insulin ([facility])      Social History:  Social History     Socioeconomic History    Marital status:    Tobacco Use    Smoking status: Never    Smokeless tobacco: Never   Vaping Use    Vaping status: Never Used   Substance and Sexual Activity    Alcohol use: Yes     Comment: Holidays    Drug use: Never    Sexual activity: Not Currently     Partners: Male   Social History Narrative    Relationships: . Mother lives with her - very independent    Children: Carlos (adults). 2 grandchildren from Quintin.     Pets: None    School: N/A    Work: Retired from [employer]. 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 Lived in [place] for many years. [place] background.      Interests: Designs jewelry, crystal shows, drawing and painting. Enjoys traveling to [place] to see son.     Spiritual: Restoration.         REVIEW OF SYSTEMS:   GENERAL: Denies fever, chills,weight change, decreased appetite  SKIN: Denies rashes, skin wounds or ulcers.  EYES: Denies blurred vision or double vision  HENT: Denies congestion, rhinorrhea, sore throat or ear pain  CHEST: Denies chest pain, or palpitations  LUNGS: Denies shortness of breath, cough, or wheezing  GI: See HPI  MUSCULOSKELETAL: See HPI  LYMPH:  Denies lymphadenopathy  NEURO: Denies headaches or lightheadedness      EXAM:   /82   Pulse 70   Temp 98.2 °F (36.8 °C)   Resp 16   Ht 5' 5\" (1.651 m)   Wt 173 lb (78.5 kg)   SpO2 98%   BMI 28.79 kg/m²   GENERAL: Well-appearing, well developed, well nourished,in no apparent distress  SKIN: no rashes,no suspicious lesions  HEAD: atraumatic, normocephalic  LUNGS: clear to auscultation bilaterally, no wheezes or rhonchi. Breathing is non labored.  CARDIO: RRR without murmur  GI: No visible scars, or masses. BS's present x4. No palpable masses or hepatosplenomegaly.  +Mild upper abdominal tenderness on palpation.    ASSESSMENT AND PLAN:     ASSESSMENT:  Encounter Diagnoses   Name Primary?    Acute bilateral low back pain without sciatica Yes    Abdominal cramping        PLAN:  - Urine unremarkable for sign of infection/hematuria.  Culture sent.  - Discussed limitations of [facility] and will refer to higher level of care for further evaluation and Tx (Location: [place] [facility]).  - Discussed case with Dr. Adame who agrees with referral.  - The patient indicates understanding of these issues and agrees to the plan.  - Pt leaves [facility] in no distress and feels comfortable driving self.

## 2024-05-29 NOTE — ED INITIAL ASSESSMENT (HPI)
Patient arrived ambulatory to room c/o symptoms that started 3 days ago. Patient states she was lifting a heavy box when she twisted and felt pain in her back. Patient also c/o generalized lower abdominal cramping. No n/v/d. No urinary complaints. No vaginal complaints. No fevers. Patient denies numbness/tingling to extremities.

## 2024-05-29 NOTE — DISCHARGE INSTRUCTIONS
Ibuprofen as needed for pain.  Flexeril as prescribed.  Do not drive while taking muscle relaxers.  Follow-up with your primary MD or Dr. Rodriguez for any worsening back pain.

## 2024-09-12 NOTE — PATIENT INSTRUCTIONS
Viral Upper Respiratory Illness (Adult)  You have a viral upper respiratory illness (URI), which is another term for the common cold. This illness is contagious during the first few days. It is spread through the air by coughing and sneezing.  It may also [Medication Risks Reviewed] : Medication risks reviewed Call your healthcare provider right away if any of these occur:  · Cough with lots of colored sputum (mucus)  · Severe headache; face, neck, or ear pain  · Difficulty swallowing due to throat pain  · Fever of 100.4°F (38°C) or higher, or as directed by you [Surgical risks reviewed] : Surgical risks reviewed [de-identified] : 69-year-old male presents to the office for follow-up of his severe bilateral knee osteoarthritis, patient reports right worse than left.  We had a discussion regarding surgical versus conservative treatment options.  The patient would like to defer surgery and exhaust all conservative therapy.  We discussed the possibility of viscosupplementation, the patient would like to proceed.  I have ordered gel injections for bilateral knees.  I recommend he start physical therapy and provided referral for that today.  The patient may take Tylenol and NSAIDs as needed for any pain.  He should follow-up after the approval of his viscosupplementation.  All questions addressed, patient agreement

## 2024-10-24 LAB — AMB EXT OCCULT BLOOD RESULT: NEGATIVE

## 2024-11-06 ENCOUNTER — MED REC SCAN ONLY (OUTPATIENT)
Dept: INTERNAL MEDICINE CLINIC | Facility: CLINIC | Age: 69
End: 2024-11-06

## 2024-12-03 NOTE — PROGRESS NOTES
FAMILY MEDICINE CLINIC NOTE - MEDICARE    HPI  Yessenia Ruiz is a 69 year old female presenting for a MA AHA (Medicare Advantage Annual Health Assessment) and Medicare Subsequent Annual Wellness visit (Pt already had Initial Annual Wellness).    #Health Maintenance  -Diet: She reports she is cutting out sugar, carbs  -Exercise: Doing walking now with dog  -Lung cancer screen: Not indicated  -Colon cancer screen: - FOBT 10/2024. Recent episode of diverticulitis  -Statin:  - 11/2023  -ASA: Not indicated  -Breast cancer screen: Mammogram 4/2024  -Breast cancer medication to reduce risk: Declines   -Periods:  Menopause  -Cervical cancer screen: Not indicated  -DEXA: DEXA 4/2024 osteopenia  -BRCA: Not indicated  -Intimate partner violence: Denies abuse  -HIV screen: Not indicated  -Hep C screen: - 11/2023 negative  -Gonorrhea/chlamydia:  Not Indicated  -Syphillis: Not indicated  -TB: Not indicated  -Tobacco/alcohol: Per below        #Immunizations  -Tdap:  Medicare  -Flu shot: Indicated - declines  -PCV13: Not indicated   -PCV20: Indicated - declines   -PPSV23: Not indicated   -RZV (preferred) or VZL: Indicated   -RSV: Optional  -COVID: Indicated       #DM  -Hba1c: 12/2024 6.8  -Microalbuminuria: 11/2023  -B12: N/A  -Pneumonia vaccine: Indicated - declines  -HepB: Declines vaccines   -Eye exam:  follows with Dr Pineda - eye report received 12/20/23  -Diabetic foot exam: 12/4/24 Bilateral barefoot skin diabetic exam is normal, visualized feet and the appearance is normal. Bilateral monofilament/sensation of both feet is normal. Pulsation pedal pulse exam of both lower legs/feet is normal as well.  -Current medications: None - does not desire to be on any medications  -Blood sugars:  -AM:      -Noon:   -PM:      -hypoglycemia:     #Overweight  -watching diet, cutting carbs  -losing weight at this time    #Osteopenia  -DEXA 4/2024     #History of diverticulitis  -needs colonoscopy still    #History of retinal  detachment  -blind in left eye   -started at age 15  -ophthalmology Dr Pineda      #Mild aortic valve regurgitation  -seen on echo 12/2023    #Additional screenings  History/Other:   Fall Risk Assessment:   She has been screened for Falls and is low risk.      Cognitive Assessment:   She had a completely normal cognitive assessment - see flowsheet entries       Functional Ability/Status:   Yessenia Ruiz has a completely normal functional assessment. See flowsheet for details.      Depression Screening (PHQ-2/PHQ-9): PHQ-2 SCORE: 0  , done 12/4/2024        Advanced Directives:   She does NOT have a Living Will. [Do you have a living will?: No]  She does NOT have a Power of  for Health Care. [Do you have a healthcare power of ?: No]  Discussed Advance Care Planning with patient (and family/surrogate if present). Standard forms made available to patient in After Visit Summary.    #Patient Care Team  Patient Care Team:  Chele Wilkes MD as PCP - General (Family Medicine)  Kelsie Najera CNM (Midwife)  Marilee Pineda MD (OPHTHALMOLOGY)      ROS  GENERAL: No fever/chills, no recent weight loss   HEENT: No visual changes, no changes in hearing, no sore throats  NECK: No pain, no swelling  RESP: No cough, no SOB  CV: No chest pain, no palpitations  GI: No abd pain, no N/V/D  MSK: No edema, no pain  SKIN: No new rashes  NEURO: No numbness, no tingling, no HA    HEALTH MAINTENANCE CHECKLIST  Health Maintenance Topics with due status: Overdue       Topic Date Due    Pneumococcal Vaccine: 65+ Years Never done    Zoster Vaccines Never done    MA Annual Health Assessment 01/01/2024    Annual Depression Screening 01/01/2024    HTN: BP Follow-Up 06/29/2024    COVID-19 Vaccine Never done    Diabetes Care A1C 09/27/2024    Influenza Vaccine Never done    Diabetes Care Foot Exam 11/28/2024    Diabetes Care: GFR 11/28/2024    Diabetes Care: Microalb/Creat Ratio 11/29/2024     Health Maintenance Topics with  due status: Due Soon       Topic Date Due    Diabetes Care Dilated Eye Exam 12/20/2024       ALLERGIES  Allergies[1]    MEDICATIONS  No current outpatient medications on file.       ACTIVE PROBLEM  Patient Active Problem List   Diagnosis    Type 2 diabetes mellitus without complication, with long-term current use of insulin ([facility])    Diverticulitis    Overweight (BMI 25.0-29.9)    Left retinal detachment    Heart murmur    Health maintenance examination    Aortic valve regurgitation    Osteopenia       PAST MEDICAL HISTORY  Past Medical History:    Diverticulitis    Retinal detachment    Blind in left eye    Type 2 diabetes mellitus without complication, with long-term current use of insulin ([facility])       PAST SOCIAL HISTORY  Social History     Socioeconomic History    Marital status:      Spouse name: Not on file    Number of children: Not on file    Years of education: Not on file    Highest education level: Not on file   Occupational History    Not on file   Tobacco Use    Smoking status: Never    Smokeless tobacco: Never   Vaping Use    Vaping status: Never Used   Substance and Sexual Activity    Alcohol use: Yes     Comment: Holidays    Drug use: Never    Sexual activity: Not Currently     Partners: Male   Other Topics Concern    Caffeine Concern Not Asked    Exercise Not Asked    Seat Belt Not Asked    Special Diet Not Asked    Stress Concern Not Asked    Weight Concern Not Asked   Social History Narrative    Relationships: . Mother lives with her - very independent    Children: Carlos (adults). 2 grandchildren from [place].     Pets: Dog    School: N/A    Work: Retired from [employer]. 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 Lived in [place] for many years. [place] background.     Interests: Designs jewelry, crystal shows, drawing and painting. Enjoys traveling to [place] to see son.     Spiritual: Pentecostalism.      Social Drivers of  Health     Financial Resource Strain: Not on file   Food Insecurity: Not on file   Transportation Needs: Not on file   Physical Activity: Not on file   Stress: Not on file   Social Connections: Not on file   Housing Stability: Not on file       CAGE Alcohol Screen:   CAGE screening score of 0 on 12/4/2024, showing low risk of alcohol abuse.          PAST SURGICAL HISTORY  Past Surgical History:   Procedure Laterality Date    Cataract extraction Right     Eye surgery Left     History of retinal detachment       PAST FAMILY HISTORY  Family History   Problem Relation Age of Onset    Stroke Mother     Cancer Father         Lung    Hyperlipidemia Sister     No Known Problems Maternal Grandmother     No Known Problems Maternal Grandfather     No Known Problems Paternal Grandmother     No Known Problems Paternal Grandfather     Colon Cancer Neg     Breast Cancer Neg        PHYSICAL EXAM  Vitals:    12/04/24 0810 12/04/24 0838   BP: 138/82 126/78   Pulse: 84    Temp: 98.4 °F (36.9 °C)    SpO2: 98%    Weight: 163 lb (73.9 kg)    Height: 5' 5\" (1.651 m)       Body mass index is 27.12 kg/m².    Medicare Hearing Assessment:  Hearing Screening    Time taken: 12/4/2024  8:51 AM  Entry User: Chele Wilkes MD  Screening Method: Finger Rub  Finger Rub Result: Pass       Visual Acuity:   Visual Acuity:   Right Eye Visual Acuity: Corrected     Left Eye Visual Acuity: Corrected     Both Eyes Visual Acuity: Corrected Both Eyes Chart Acuity: 20/25   Able To Tolerate Visual Acuity: Yes          GENERAL: NAD  HEENT: Moist mucous membranes, no tonsillar swelling or erythema, PERRLA bilat, TM translucent and non-bulging  NECK: Supple, non-tender  RESP: CTAB, no wheezing, no rales, no rhonchi  CV: RRR, 2/6 systolic murmur  GI: Soft, non-distended, non-tender, no guarding, no rebound, no masses  MSK: No edema. Bilateral barefoot skin diabetic exam is normal, visualized feet and the appearance is normal. Bilateral monofilament/sensation of  both feet is normal. Pulsation pedal pulse exam of both lower legs/feet is normal as well.  SKIN: Warm and dry, no rashes  NEURO: Answering questions appropriately    LABS    Lab Results   Component Value Date    WBC 7.8 11/21/2023    HGB 15.2 11/21/2023    .0 11/21/2023       Lab Results   Component Value Date    AST 19 11/28/2023    ALT 20 11/28/2023    CA 9.9 11/28/2023    ALB 4.5 11/28/2023    TSH 3.500 06/02/2022    CREATSERUM 0.72 11/28/2023     (H) 11/28/2023       Lab Results   Component Value Date    CHOLEST 144 11/28/2023    HDL 65 11/28/2023    LDL 65 11/28/2023    TRIG 61 11/28/2023         DIAGNOSTICS    ASSESSMENT/PLAN  Problem List Items Addressed This Visit          HCC Problems    Type 2 diabetes mellitus without complication, with long-term current use of insulin (HCC) - Primary     Diabetes controlled.  Goal A1c is less than 7.  Can continue lifestyle modifications  Monitor CMP, lipid panel, urine miroalbumin  Previously discussed need for statin medication however she declines.   See ophthalmology   She declines vaccines including the Prevnar 20 vaccine.         Relevant Orders    POC Glycohemoglobin [94225] (Completed)    Comp Metabolic Panel (14)    Lipid Panel    Microalb/Creat Ratio, Random Urine    Ophthalmology Referral - External       Cardiac and Vasculature    Aortic valve regurgitation     Seen on echo.  Can monitor symptomatically for now.         Heart murmur     History of mild heart murmur.            Endocrine and Metabolic    Overweight (BMI 25.0-29.9)     Losing weight.  Continue healthy diet.  Advised to increase exercise if possible            Gastrointestinal and Abdominal    Diverticulitis     Patient with a history diverticulitis.  Pain is now resolved.  Referral to GI for colonoscopy.  Advise hydration and increasing fiber in diet.  Follow-up as needed.         Relevant Orders    GASTRO - INTERNAL       Musculoskeletal and Injuries    Osteopenia     Focus on  weight bearing exercise.  Focus on adequate calcium and vitamin D intake.            Eye    Left retinal detachment     Patient reports a history of left retinal detachment.  She is blind in her left eye   Follows with ophthalmology.         Relevant Orders    Ophthalmology Referral - External       Health Encounters    Health maintenance examination     Exercise and diet advised.  CMP, lipid panel - discussed ideally going to [facility] as her preferred lab site, however she states that she prefers to go to an [place] lab site. She understands potential billing consequences.  Flu shot declined  Shingles vaccine declined  Prevnar 20 vaccine declined  Advanced directive information - she is going to go through her .  Advised COVID vaccine-declines.  Colonoscopy referral provided.         Relevant Orders    Comp Metabolic Panel (14)    Lipid Panel    GASTRO - INTERNAL       Return in about 6 months (around 6/4/2025) for follow up.    Chele Wilkes MD  Family Medicine    12/3/2024         Supplementary Documentation:   General Health:  In the past six months, have you lost more than 10 pounds without trying?: 2 - No  Has your appetite been poor?: No  Type of Diet: Balanced  How does the patient maintain a good energy level?: Daily Walks  How would you describe your daily physical activity?: Moderate  How would you describe your current health state?: Good  How do you maintain positive mental well-being?: Social Interaction;Puzzles;Games;Visiting Friends;Visiting Family  On a scale of 0 to 10, with 0 being no pain and 10 being severe pain, what is your pain level?: (Patient-Rptd) 0 - (None)  In the past six months, have you experienced urine leakage?: 0-No  At any time do you feel concerned for the safety/well-being of yourself and/or your children, in your home or elsewhere?: No  Have you had any immunizations at another office such as Influenza, Hepatitis B, Tetanus, or Pneumococcal?: No       Yessenia SCHULTZ  [facility] SCREENING SCHEDULE   Tests on this list are recommended by your physician but may not be covered, or covered at this frequency, by your insurer.   Please check with your insurance carrier before scheduling to verify coverage.   PREVENTATIVE SERVICES FREQUENCY &  COVERAGE DETAILS LAST COMPLETION DATE   Diabetes Screening    Fasting Blood Sugar /  Glucose    One screening every 12 months if never tested or if previously tested but not diagnosed with pre-diabetes   One screening every 6 months if diagnosed with pre-diabetes Lab Results   Component Value Date     (H) 11/28/2023        Cardiovascular Disease Screening    Lipid Panel  Cholesterol  Lipoprotein (HDL)  Triglycerides Covered every 5 years for all Medicare beneficiaries without apparent signs or symptoms of cardiovascular disease Lab Results   Component Value Date    CHOLEST 144 11/28/2023    HDL 65 11/28/2023    LDL 65 11/28/2023    TRIG 61 11/28/2023         Electrocardiogram (EKG)   Covered if needed at Welcome to Medicare, and non-screening if indicated for medical reasons -      Ultrasound Screening for Abdominal Aortic Aneurysm (AAA) Covered once in a lifetime for one of the following risk factors    Men who are 65-75 years old and have ever smoked    Anyone with a family history -     Colorectal Cancer Screening  Covered for ages 50-85; only need ONE of the following:    Colonoscopy   Covered every 10 years    Covered every 2 years if patient is at high risk or previous colonoscopy was abnormal -    Health Maintenance   Topic Date Due    Colorectal Cancer Screening  10/24/2025       Flexible Sigmoidoscopy   Covered every 4 years -    Fecal Occult Blood Test Covered annually 10/24/2024   Bone Density Screening    Bone density screening    Covered every 2 years after age 65 if diagnosed with risk of osteoporosis or estrogen deficiency.    Covered yearly for long-term glucocorticoid medication use (Steroids) Last Dexa Scan:    XR DEXA  BONE DENSITOMETRY (CPT=77080) 04/29/2024      No recommendations at this time   Pap and Pelvic    Pap   Covered every 2 years for women at normal risk; Annually if at high risk 09/17/2021  No recommendations at this time    Chlamydia Annually if high risk 10/24/2024  No recommendations at this time   Screening Mammogram    Mammogram     Recommend annually for all female patients aged 40 and older    One baseline mammogram covered for patients aged 35-39 04/29/2024    Health Maintenance   Topic Date Due    Mammogram  04/29/2025       Immunizations    Influenza Covered once per flu season  Please get every year -  Influenza Vaccine(1) Never done    Pneumococcal Each vaccine (Wujafjh92 & Nuogcijrd01) covered once after 65 Prevnar 13: -    [id number]: -     Pneumococcal Vaccination(1 of 2 - PCV) Never done    Hepatitis B One screening covered for patients with certain risk factors   -  No recommendations at this time    Tetanus Toxoid Not covered by Medicare Part B unless medically necessary (cut with metal); may be covered with your pharmacy prescription benefits -    Tetanus, Diptheria and Pertusis TD and TDaP Not covered by Medicare Part B -  No recommendations at this time    Zoster Not covered by Medicare Part B; may be covered with your pharmacy  prescription benefits -  Zoster Vaccines(1 of 2) Never done     Diabetes      Hemoglobin A1C Annually; if last result is elevated, may be asked to retest more frequently.    Medicare covers every 3 months Lab Results   Component Value Date     (H) 12/20/2021    A1C 6.8 (A) 12/04/2024       No recommendations at this time    Creat/alb ratio Annually Lab Results   Component Value Date    MICROALBCREA 47.9 (H) 06/02/2022       LDL Annually Lab Results   Component Value Date    LDL 65 11/28/2023       Dilated Eye Exam Annually Last Diabetic Eye Exam:  Last Dilated Eye Exam: 12/20/23  Eye Exam shows Diabetic Retinopathy?: No                [1]   Allergies  Allergen  Reactions    Codeine NAUSEA AND VOMITING

## 2024-12-04 ENCOUNTER — LAB ENCOUNTER (OUTPATIENT)
Dept: LAB | Age: 69
End: 2024-12-04
Attending: FAMILY MEDICINE
Payer: MEDICARE

## 2024-12-04 ENCOUNTER — OFFICE VISIT (OUTPATIENT)
Dept: INTERNAL MEDICINE CLINIC | Facility: CLINIC | Age: 69
End: 2024-12-04
Payer: MEDICARE

## 2024-12-04 VITALS
SYSTOLIC BLOOD PRESSURE: 126 MMHG | HEART RATE: 84 BPM | HEIGHT: 65 IN | DIASTOLIC BLOOD PRESSURE: 78 MMHG | TEMPERATURE: 98 F | OXYGEN SATURATION: 98 % | BODY MASS INDEX: 27.16 KG/M2 | WEIGHT: 163 LBS

## 2024-12-04 DIAGNOSIS — E11.9 TYPE 2 DIABETES MELLITUS WITHOUT COMPLICATION, WITH LONG-TERM CURRENT USE OF INSULIN (HCC): Primary | ICD-10-CM

## 2024-12-04 DIAGNOSIS — H33.22 LEFT RETINAL DETACHMENT: ICD-10-CM

## 2024-12-04 DIAGNOSIS — K57.92 DIVERTICULITIS: ICD-10-CM

## 2024-12-04 DIAGNOSIS — Z00.00 HEALTH MAINTENANCE EXAMINATION: ICD-10-CM

## 2024-12-04 DIAGNOSIS — I35.1 AORTIC VALVE INSUFFICIENCY, ETIOLOGY OF CARDIAC VALVE DISEASE UNSPECIFIED: ICD-10-CM

## 2024-12-04 DIAGNOSIS — R01.1 HEART MURMUR: ICD-10-CM

## 2024-12-04 DIAGNOSIS — M85.80 OSTEOPENIA, UNSPECIFIED LOCATION: ICD-10-CM

## 2024-12-04 DIAGNOSIS — Z79.4 TYPE 2 DIABETES MELLITUS WITHOUT COMPLICATION, WITH LONG-TERM CURRENT USE OF INSULIN (HCC): Primary | ICD-10-CM

## 2024-12-04 DIAGNOSIS — E66.3 OVERWEIGHT (BMI 25.0-29.9): ICD-10-CM

## 2024-12-04 LAB
ALBUMIN SERPL-MCNC: 4.7 G/DL (ref 3.2–4.8)
ALBUMIN/GLOB SERPL: 3.4 {RATIO} (ref 1–2)
ALP LIVER SERPL-CCNC: 101 U/L
ALT SERPL-CCNC: 17 U/L
ANION GAP SERPL CALC-SCNC: 9 MMOL/L (ref 0–18)
AST SERPL-CCNC: 22 U/L (ref ?–34)
BILIRUB SERPL-MCNC: 0.9 MG/DL (ref 0.2–1.1)
BUN BLD-MCNC: 17 MG/DL (ref 9–23)
BUN/CREAT SERPL: 30.4 (ref 10–20)
CALCIUM BLD-MCNC: 10.3 MG/DL (ref 8.7–10.4)
CHLORIDE SERPL-SCNC: 103 MMOL/L (ref 98–112)
CHOLEST SERPL-MCNC: 179 MG/DL (ref ?–200)
CO2 SERPL-SCNC: 27 MMOL/L (ref 21–32)
CREAT BLD-MCNC: 0.56 MG/DL
CREAT UR-SCNC: 177.5 MG/DL
EGFRCR SERPLBLD CKD-EPI 2021: 100 ML/MIN/1.73M2 (ref 60–?)
FASTING PATIENT LIPID ANSWER: YES
FASTING STATUS PATIENT QL REPORTED: YES
GLOBULIN PLAS-MCNC: 1.4 G/DL (ref 2–3.5)
GLUCOSE BLD-MCNC: 139 MG/DL (ref 70–99)
HDLC SERPL-MCNC: 46 MG/DL (ref 40–59)
HEMOGLOBIN A1C: 6.8 % (ref 4.3–5.6)
LDLC SERPL CALC-MCNC: 116 MG/DL (ref ?–100)
MICROALBUMIN UR-MCNC: <0.3 MG/DL
NONHDLC SERPL-MCNC: 133 MG/DL (ref ?–130)
OSMOLALITY SERPL CALC.SUM OF ELEC: 292 MOSM/KG (ref 275–295)
POTASSIUM SERPL-SCNC: 4.2 MMOL/L (ref 3.5–5.1)
PROT SERPL-MCNC: 6.1 G/DL (ref 5.7–8.2)
SODIUM SERPL-SCNC: 139 MMOL/L (ref 136–145)
TRIGL SERPL-MCNC: 95 MG/DL (ref 30–149)
VLDLC SERPL CALC-MCNC: 16 MG/DL (ref 0–30)

## 2024-12-04 PROCEDURE — 36415 COLL VENOUS BLD VENIPUNCTURE: CPT | Performed by: FAMILY MEDICINE

## 2024-12-04 PROCEDURE — G0439 PPPS, SUBSEQ VISIT: HCPCS | Performed by: FAMILY MEDICINE

## 2024-12-04 PROCEDURE — 1160F RVW MEDS BY RX/DR IN RCRD: CPT | Performed by: FAMILY MEDICINE

## 2024-12-04 PROCEDURE — 80053 COMPREHEN METABOLIC PANEL: CPT | Performed by: FAMILY MEDICINE

## 2024-12-04 PROCEDURE — 1126F AMNT PAIN NOTED NONE PRSNT: CPT | Performed by: FAMILY MEDICINE

## 2024-12-04 PROCEDURE — 1159F MED LIST DOCD IN RCRD: CPT | Performed by: FAMILY MEDICINE

## 2024-12-04 PROCEDURE — 83036 HEMOGLOBIN GLYCOSYLATED A1C: CPT | Performed by: FAMILY MEDICINE

## 2024-12-04 PROCEDURE — 80061 LIPID PANEL: CPT | Performed by: FAMILY MEDICINE

## 2024-12-04 PROCEDURE — 82043 UR ALBUMIN QUANTITATIVE: CPT | Performed by: FAMILY MEDICINE

## 2024-12-04 PROCEDURE — 82570 ASSAY OF URINE CREATININE: CPT | Performed by: FAMILY MEDICINE

## 2024-12-04 PROCEDURE — 96160 PT-FOCUSED HLTH RISK ASSMT: CPT | Performed by: FAMILY MEDICINE

## 2024-12-04 PROCEDURE — 1170F FXNL STATUS ASSESSED: CPT | Performed by: FAMILY MEDICINE

## 2024-12-04 NOTE — ASSESSMENT & PLAN NOTE
Exercise and diet advised.  CMP, lipid panel - discussed ideally going to [facility] as her preferred lab site, however she states that she prefers to go to an [facility] lab site. She understands potential billing consequences.  Flu shot declined  Shingles vaccine declined  Prevnar 20 vaccine declined  Advanced directive information - she is going to go through her .  Advised COVID vaccine-declines.  Colonoscopy referral provided.

## 2024-12-04 NOTE — PATIENT INSTRUCTIONS
PATIENT INSTRUCTIONS    Thank you for seeing me today, it was a pleasure taking care of you.  Please check out at the  and schedule a follow up appointment.  Return in about 6 months (around 6/4/2025) for follow up.  Please remember that the preferred vijay period for appointments is 5 minutes. This is to help maximize the amount of time that we can spend together at our visits.    Please get your labs drawn at your preferred lab.  The following imaging studies were ordered: None, plan for mammogram next year  Please also follow up with the following specialists: Eye doctor, GI  Please fill out the advance directive information (power of  documents) and bring a copy to our clinic.            Best,   Dr. Wilkes    [facility] LABS AND IMAGING INFORMATION    Here are some lab and imaging locations available to you. Please note that some of the times and availabilities are subject to change. Please refer to the [employer] webpage for the most recent updates. There are also additional locations that can be found on the [employer] webpage.    To schedule a lab appointment, please call [phone].    To schedule an imaging appointment, please call [phone], option 2      [address] [address]    Lab Hours  Monday - Friday 7:30am - 5pm  Saturday 6:30am - 3pm    X-ray Hours  Call [phone] for hours or to schedule      [facility]  [address] [place] [address]    Lab Hours  Monday - Friday 6:30am - 8pm  Saturday 6:30am - 3pm  Sunday 6:30am - 3pm    X-ray Hours  Call [phone] for hours or to schedule      [facility]  [address] [place] [address]    Lab Hours  Monday - Friday 7:30am - 5pm  Saturday 7:30am - 11:30am    X-ray Hours  None at this location      [facility] [address] [address]  [place] [address]    Lab Hours  Monday - Friday 8am - 12pm    X-ray Hours  Call [phone] for hours or to schedule      [place] [facility] [place] [address] [place] [address]    Lab Hours  Monday - Friday 7:30am - 5pm  Saturday 6:30am - 3pm    X-ray Hours  Call [phone] for hours or to schedule      [facility] [place]  [address]  [place] [address]    Lab Hours  Monday - Friday 7:30am - 4pm (closed 12:15pm - 1pm)    X-ray Hours  Call [phone] for hours or to schedule      [facility] [place]  [address]  [place] [address]    Lab Hours  Monday - Friday 8am - 4:30pm (closed 12:15pm - 1pm)    X-ray Hours  None at this location

## 2024-12-04 NOTE — ASSESSMENT & PLAN NOTE
Patient with a history diverticulitis.  Pain is now resolved.  Referral to GI for colonoscopy.  Advise hydration and increasing fiber in diet.  Follow-up as needed.

## 2024-12-04 NOTE — ASSESSMENT & PLAN NOTE
Diabetes controlled.  Goal A1c is less than 7.  Can continue lifestyle modifications  Monitor CMP, lipid panel, urine miroalbumin  Previously discussed need for statin medication however she declines.   See ophthalmology   She declines vaccines including the Prevnar 20 vaccine.

## 2025-04-03 ENCOUNTER — NURSE TRIAGE (OUTPATIENT)
Dept: INTERNAL MEDICINE CLINIC | Facility: CLINIC | Age: 70
End: 2025-04-03

## 2025-04-03 NOTE — TELEPHONE ENCOUNTER
Patient of Dr. Wilkes.    Patient has a history of diverticulitis; diagnosed in the ER on 11/21/2023.    Symptoms have returned and the patient would like a refill of the medications prescribed in the ER on 11/21/2023.    Please call patient to discuss symptoms and provide advice for care:    [phone]     KG

## 2025-04-03 NOTE — TELEPHONE ENCOUNTER
Patient will go to the [place] ED when she has time.   Reason for Disposition   MILD TO MODERATE constant pain lasting > 2 hours    Answer Assessment - Initial Assessment Questions  1. LOCATION: \"Where does it hurt?\"       Lower stomach pressure  2. RADIATION: \"Does the pain shoot anywhere else?\" (e.g., chest, back)      No   3. ONSET: \"When did the pain begin?\" (e.g., minutes, hours or days ago)       4/2/25  4. SUDDEN: \"Gradual or sudden onset?\"      sudden  5. PATTERN \"Does the pain come and go, or is it constant?\"     - If it comes and goes: \"How long does it last?\" \"Do you have pain now?\"      (Note: Comes and goes means the pain is intermittent. It goes away completely between bouts.)     - If constant: \"Is it getting better, staying the same, or getting worse?\"       (Note: Constant means the pain never goes away completely; most serious pain is constant and gets worse.)      Constant   6. SEVERITY: \"How bad is the pain?\"  (e.g., Scale 1-10; mild, moderate, or severe)     - MILD (1-3): Doesn't interfere with normal activities, abdomen soft and not tender to touch.      - MODERATE (4-7): Interferes with normal activities or awakens from sleep, abdomen tender to touch.      - SEVERE (8-10): Excruciating pain, doubled over, unable to do any normal activities.        Mild   7. RECURRENT SYMPTOM: \"Have you ever had this type of stomach pain before?\" If Yes, ask: \"When was the last time?\" and \"What happened that time?\"       2023 Diverticulitis  8. CAUSE: \"What do you think is causing the stomach pain?\"      Diverticulitis  9. RELIEVING/AGGRAVATING FACTORS: \"What makes it better or worse?\" (e.g., antacids, bending or twisting motion, bowel movement)      No   10. OTHER SYMPTOMS: \"Do you have any other symptoms?\" (e.g., back pain, diarrhea, fever, urination pain, vomiting)        No   11. PREGNANCY: \"Is there any chance you are pregnant?\" \"When was your last menstrual period?\"        No    Protocols used: Abdominal  Pain - Female-A-[place]

## (undated) DIAGNOSIS — B35.1 ONYCHOMYCOSIS: Primary | ICD-10-CM

## (undated) NOTE — MR AVS SNAPSHOT
After Visit Summary   9/17/2021    Edgar Sierra   MRN: [id number]           Visit Information     Date & Time  9/17/2021  9:30 AM Provider  Jagdish Nguyen, [address], [address] [facility] [address], [facility]  Phone  [phone] [address] [facility]    9/23/2021 8:20 AM [facility] OF THE [facility] [address] [facility]      Imaging Scheduling Instructions     Around September 17, 2021   Imaging:   [facility] DIAGNOSTIC BILATERAL (XOD=[id number])    Instructions:  To sched never convenient. If you are dealing with a   non-emergency, consider your options before heading to an ER.   VIDEO VISITS  Visit face-to-face with a [facility] physician or   LORE using your mobile device or computer   using [employer].    e-VISITS  Communicate with

## (undated) NOTE — LETTER
:  1955      To Whom It May Concern: This patient was seen in our office on 22 via virtual visit. Work status: May return to work full-time, no restrictions    May return to work status per above effective 1/10/22.     If this office ma